# Patient Record
Sex: FEMALE | Race: WHITE | NOT HISPANIC OR LATINO
[De-identification: names, ages, dates, MRNs, and addresses within clinical notes are randomized per-mention and may not be internally consistent; named-entity substitution may affect disease eponyms.]

---

## 2017-10-20 PROBLEM — Z00.00 ENCOUNTER FOR PREVENTIVE HEALTH EXAMINATION: Status: ACTIVE | Noted: 2017-10-20

## 2017-12-14 ENCOUNTER — APPOINTMENT (OUTPATIENT)
Dept: RHEUMATOLOGY | Facility: CLINIC | Age: 57
End: 2017-12-14
Payer: COMMERCIAL

## 2017-12-14 VITALS
WEIGHT: 140 LBS | HEIGHT: 67 IN | DIASTOLIC BLOOD PRESSURE: 72 MMHG | RESPIRATION RATE: 17 BRPM | HEART RATE: 75 BPM | BODY MASS INDEX: 21.97 KG/M2 | SYSTOLIC BLOOD PRESSURE: 132 MMHG | OXYGEN SATURATION: 98 %

## 2017-12-14 DIAGNOSIS — Z82.49 FAMILY HISTORY OF ISCHEMIC HEART DISEASE AND OTHER DISEASES OF THE CIRCULATORY SYSTEM: ICD-10-CM

## 2017-12-14 DIAGNOSIS — Z87.39 PERSONAL HISTORY OF OTHER DISEASES OF THE MUSCULOSKELETAL SYSTEM AND CONNECTIVE TISSUE: ICD-10-CM

## 2017-12-14 DIAGNOSIS — L65.9 NONSCARRING HAIR LOSS, UNSPECIFIED: ICD-10-CM

## 2017-12-14 DIAGNOSIS — I25.2 OLD MYOCARDIAL INFARCTION: ICD-10-CM

## 2017-12-14 DIAGNOSIS — Z86.69 PERSONAL HISTORY OF OTHER DISEASES OF THE NERVOUS SYSTEM AND SENSE ORGANS: ICD-10-CM

## 2017-12-14 DIAGNOSIS — M25.50 PAIN IN UNSPECIFIED JOINT: ICD-10-CM

## 2017-12-14 DIAGNOSIS — Z83.2 FAMILY HISTORY OF DISEASES OF THE BLOOD AND BLOOD-FORMING ORGANS AND CERTAIN DISORDERS INVOLVING THE IMMUNE MECHANISM: ICD-10-CM

## 2017-12-14 DIAGNOSIS — Z87.891 PERSONAL HISTORY OF NICOTINE DEPENDENCE: ICD-10-CM

## 2017-12-14 DIAGNOSIS — Z86.79 PERSONAL HISTORY OF OTHER DISEASES OF THE CIRCULATORY SYSTEM: ICD-10-CM

## 2017-12-14 DIAGNOSIS — Z82.5 FAMILY HISTORY OF ASTHMA AND OTHER CHRONIC LOWER RESPIRATORY DISEASES: ICD-10-CM

## 2017-12-14 DIAGNOSIS — Z82.69 FAMILY HISTORY OF OTHER DISEASES OF THE MUSCULOSKELETAL SYSTEM AND CONNECTIVE TISSUE: ICD-10-CM

## 2017-12-14 DIAGNOSIS — Z78.9 OTHER SPECIFIED HEALTH STATUS: ICD-10-CM

## 2017-12-14 DIAGNOSIS — Z87.19 PERSONAL HISTORY OF OTHER DISEASES OF THE DIGESTIVE SYSTEM: ICD-10-CM

## 2017-12-14 DIAGNOSIS — Z86.39 PERSONAL HISTORY OF OTHER ENDOCRINE, NUTRITIONAL AND METABOLIC DISEASE: ICD-10-CM

## 2017-12-14 DIAGNOSIS — Z83.3 FAMILY HISTORY OF DIABETES MELLITUS: ICD-10-CM

## 2017-12-14 DIAGNOSIS — Z78.0 ASYMPTOMATIC MENOPAUSAL STATE: ICD-10-CM

## 2017-12-14 DIAGNOSIS — Z82.61 FAMILY HISTORY OF ARTHRITIS: ICD-10-CM

## 2017-12-14 DIAGNOSIS — H04.123 DRY EYE SYNDROME OF BILATERAL LACRIMAL GLANDS: ICD-10-CM

## 2017-12-14 DIAGNOSIS — Z87.898 PERSONAL HISTORY OF OTHER SPECIFIED CONDITIONS: ICD-10-CM

## 2017-12-14 DIAGNOSIS — R21 RASH AND OTHER NONSPECIFIC SKIN ERUPTION: ICD-10-CM

## 2017-12-14 DIAGNOSIS — M19.90 UNSPECIFIED OSTEOARTHRITIS, UNSPECIFIED SITE: ICD-10-CM

## 2017-12-14 PROCEDURE — 99205 OFFICE O/P NEW HI 60 MIN: CPT

## 2017-12-15 PROBLEM — Z82.5 FAMILY HISTORY OF ASTHMA: Status: ACTIVE | Noted: 2017-12-14

## 2017-12-15 PROBLEM — H04.123 DRY EYES: Status: RESOLVED | Noted: 2017-12-14 | Resolved: 2017-12-15

## 2017-12-15 PROBLEM — Z82.49 FAMILY HISTORY OF HYPERTENSION: Status: ACTIVE | Noted: 2017-12-14

## 2017-12-15 PROBLEM — Z87.898 HISTORY OF VERTIGO: Status: RESOLVED | Noted: 2017-12-14 | Resolved: 2017-12-15

## 2017-12-15 PROBLEM — Z86.79 HISTORY OF HYPERTENSION: Status: RESOLVED | Noted: 2017-12-14 | Resolved: 2017-12-15

## 2017-12-15 PROBLEM — Z87.19 HISTORY OF DRY MOUTH: Status: RESOLVED | Noted: 2017-12-14 | Resolved: 2017-12-15

## 2017-12-15 PROBLEM — I25.2 HISTORY OF MYOCARDIAL INFARCTION: Status: RESOLVED | Noted: 2017-12-14 | Resolved: 2017-12-15

## 2017-12-15 PROBLEM — Z82.5 FAMILY HISTORY OF EMPHYSEMA: Status: ACTIVE | Noted: 2017-12-14

## 2017-12-15 PROBLEM — Z82.69 FAMILY HISTORY OF OSTEOARTHRITIS: Status: ACTIVE | Noted: 2017-12-14

## 2017-12-15 PROBLEM — Z78.0 HISTORY OF MENOPAUSE: Status: RESOLVED | Noted: 2017-12-14 | Resolved: 2017-12-15

## 2017-12-15 PROBLEM — Z87.39 HISTORY OF BACK PAIN: Status: RESOLVED | Noted: 2017-12-14 | Resolved: 2017-12-15

## 2017-12-15 PROBLEM — L65.9 HAIR LOSS: Status: RESOLVED | Noted: 2017-12-14 | Resolved: 2017-12-15

## 2017-12-15 PROBLEM — Z82.61 FAMILY HISTORY OF RHEUMATOID ARTHRITIS: Status: ACTIVE | Noted: 2017-12-14

## 2017-12-15 PROBLEM — Z86.39 HISTORY OF HYPOTHYROIDISM: Status: RESOLVED | Noted: 2017-12-15 | Resolved: 2017-12-15

## 2017-12-15 PROBLEM — Z83.2 FAMILY HISTORY OF SARCOIDOSIS: Status: ACTIVE | Noted: 2017-12-14

## 2017-12-15 PROBLEM — Z87.891 HISTORY OF TOBACCO USE IN PAST YEAR: Status: ACTIVE | Noted: 2017-12-14

## 2017-12-15 PROBLEM — Z82.49 FAMILY HISTORY OF HEART FAILURE: Status: ACTIVE | Noted: 2017-12-14

## 2017-12-15 PROBLEM — R21 RASH: Status: RESOLVED | Noted: 2017-12-14 | Resolved: 2017-12-15

## 2017-12-15 PROBLEM — Z82.69 FAMILY HISTORY OF GOUT: Status: ACTIVE | Noted: 2017-12-14

## 2017-12-15 PROBLEM — Z83.3 FAMILY HISTORY OF DIABETES MELLITUS: Status: ACTIVE | Noted: 2017-12-14

## 2017-12-15 PROBLEM — Z86.79 HISTORY OF RAYNAUD'S SYNDROME: Status: RESOLVED | Noted: 2017-12-14 | Resolved: 2017-12-15

## 2017-12-15 PROBLEM — Z87.19 HISTORY OF CONSTIPATION: Status: RESOLVED | Noted: 2017-12-14 | Resolved: 2017-12-15

## 2017-12-15 RX ORDER — ALBUTEROL SULFATE 90 UG/1
108 (90 BASE) AEROSOL, METERED RESPIRATORY (INHALATION)
Qty: 8 | Refills: 0 | Status: ACTIVE | COMMUNITY
Start: 2017-08-22

## 2017-12-15 RX ORDER — LEVOTHYROXINE SODIUM 0.15 MG/1
150 TABLET ORAL
Qty: 30 | Refills: 0 | Status: ACTIVE | COMMUNITY
Start: 2017-08-16

## 2017-12-15 RX ORDER — PNV NO.95/FERROUS FUM/FOLIC AC 28MG-0.8MG
TABLET ORAL
Refills: 0 | Status: ACTIVE | COMMUNITY

## 2017-12-15 RX ORDER — FLUTICASONE PROPIONATE AND SALMETEROL 50; 250 UG/1; UG/1
250-50 POWDER RESPIRATORY (INHALATION)
Qty: 180 | Refills: 0 | Status: ACTIVE | COMMUNITY
Start: 2017-09-24

## 2017-12-15 RX ORDER — IBUPROFEN 800 MG/1
800 TABLET, FILM COATED ORAL
Qty: 90 | Refills: 0 | Status: DISCONTINUED | COMMUNITY
End: 2017-12-15

## 2017-12-15 RX ORDER — ALLOPURINOL 100 MG/1
100 TABLET ORAL
Qty: 30 | Refills: 0 | Status: ACTIVE | COMMUNITY
Start: 2017-08-16

## 2017-12-15 RX ORDER — VITAMIN K2 90 MCG
125 MCG CAPSULE ORAL
Refills: 0 | Status: ACTIVE | COMMUNITY

## 2017-12-15 RX ORDER — ALENDRONATE SODIUM 70 MG/1
70 TABLET ORAL
Qty: 4 | Refills: 0 | Status: ACTIVE | COMMUNITY
Start: 2017-12-09

## 2017-12-15 RX ORDER — MECLIZINE HYDROCHLORIDE 25 MG/1
25 TABLET ORAL
Qty: 30 | Refills: 0 | Status: ACTIVE | COMMUNITY
Start: 2017-08-16

## 2017-12-15 RX ORDER — ALBUTEROL 90 MCG
AEROSOL (GRAM) INHALATION
Refills: 0 | Status: ACTIVE | COMMUNITY

## 2017-12-15 RX ORDER — CLOPIDOGREL BISULFATE 75 MG/1
75 TABLET, FILM COATED ORAL
Qty: 30 | Refills: 0 | Status: ACTIVE | COMMUNITY
Start: 2017-08-16

## 2018-01-10 ENCOUNTER — APPOINTMENT (OUTPATIENT)
Dept: RHEUMATOLOGY | Facility: CLINIC | Age: 58
End: 2018-01-10
Payer: COMMERCIAL

## 2018-01-10 VITALS
OXYGEN SATURATION: 96 % | HEART RATE: 73 BPM | RESPIRATION RATE: 16 BRPM | DIASTOLIC BLOOD PRESSURE: 72 MMHG | SYSTOLIC BLOOD PRESSURE: 136 MMHG | BODY MASS INDEX: 21.97 KG/M2 | WEIGHT: 140 LBS | TEMPERATURE: 98.3 F | HEIGHT: 67 IN

## 2018-01-10 DIAGNOSIS — Z87.898 PERSONAL HISTORY OF OTHER SPECIFIED CONDITIONS: ICD-10-CM

## 2018-01-10 PROCEDURE — 99214 OFFICE O/P EST MOD 30 MIN: CPT

## 2018-01-10 RX ORDER — MILNACIPRAN HYDROCHLORIDE 12.5-25-5
12.5 & 25 & 5 KIT ORAL
Qty: 1 | Refills: 0 | Status: DISCONTINUED | COMMUNITY
Start: 2017-12-14 | End: 2018-01-10

## 2018-01-10 RX ORDER — AMOXICILLIN 500 MG/1
500 TABLET, FILM COATED ORAL
Qty: 21 | Refills: 0 | Status: DISCONTINUED | COMMUNITY
Start: 2017-07-10

## 2018-01-10 RX ORDER — BENZONATATE 200 MG/1
200 CAPSULE ORAL
Qty: 20 | Refills: 0 | Status: DISCONTINUED | COMMUNITY
Start: 2017-09-28

## 2018-01-10 RX ORDER — MILNACIPRAN HYDROCHLORIDE 50 MG/1
50 TABLET, FILM COATED ORAL
Qty: 180 | Refills: 0 | Status: DISCONTINUED | COMMUNITY
Start: 2017-12-14 | End: 2018-01-10

## 2018-01-10 RX ORDER — PREDNISONE 20 MG/1
20 TABLET ORAL
Qty: 5 | Refills: 0 | Status: DISCONTINUED | COMMUNITY
Start: 2017-09-28

## 2018-01-10 RX ORDER — CLARITHROMYCIN 500 MG/1
500 TABLET, FILM COATED ORAL
Qty: 20 | Refills: 0 | Status: DISCONTINUED | COMMUNITY
Start: 2017-09-28

## 2018-01-27 ENCOUNTER — RX RENEWAL (OUTPATIENT)
Age: 58
End: 2018-01-27

## 2018-03-14 ENCOUNTER — APPOINTMENT (OUTPATIENT)
Dept: RHEUMATOLOGY | Facility: CLINIC | Age: 58
End: 2018-03-14
Payer: COMMERCIAL

## 2018-03-14 VITALS
OXYGEN SATURATION: 94 % | TEMPERATURE: 98.1 F | RESPIRATION RATE: 17 BRPM | HEIGHT: 67 IN | HEART RATE: 74 BPM | BODY MASS INDEX: 21.97 KG/M2 | DIASTOLIC BLOOD PRESSURE: 69 MMHG | SYSTOLIC BLOOD PRESSURE: 118 MMHG | WEIGHT: 140 LBS

## 2018-03-14 DIAGNOSIS — L65.9 NONSCARRING HAIR LOSS, UNSPECIFIED: ICD-10-CM

## 2018-03-14 PROCEDURE — 99214 OFFICE O/P EST MOD 30 MIN: CPT

## 2018-03-14 RX ORDER — CYCLOBENZAPRINE HYDROCHLORIDE 10 MG/1
10 TABLET, FILM COATED ORAL 3 TIMES DAILY
Qty: 90 | Refills: 3 | Status: DISCONTINUED | COMMUNITY
Start: 2017-08-16 | End: 2018-03-14

## 2018-03-14 RX ORDER — GABAPENTIN 100 MG
100 TABLET ORAL
Refills: 0 | Status: DISCONTINUED | COMMUNITY
End: 2018-03-14

## 2018-03-14 RX ORDER — GABAPENTIN 100 MG/1
100 CAPSULE ORAL
Qty: 30 | Refills: 0 | Status: DISCONTINUED | COMMUNITY
Start: 2017-12-21 | End: 2018-03-14

## 2018-03-14 RX ORDER — BACLOFEN 10 MG/1
10 TABLET ORAL
Qty: 270 | Refills: 0 | Status: DISCONTINUED | COMMUNITY
Start: 2018-01-10 | End: 2018-03-14

## 2018-03-14 RX ORDER — PILOCARPINE HYDROCHLORIDE 7.5 MG/1
7.5 TABLET, FILM COATED ORAL 3 TIMES DAILY
Qty: 270 | Refills: 0 | Status: DISCONTINUED | COMMUNITY
Start: 2017-12-15 | End: 2018-03-14

## 2018-03-14 RX ORDER — OXYCODONE AND ACETAMINOPHEN 5; 325 MG/1; MG/1
5-325 TABLET ORAL
Qty: 30 | Refills: 0 | Status: DISCONTINUED | COMMUNITY
Start: 2017-08-30 | End: 2018-03-14

## 2018-03-14 RX ORDER — CEVIMELINE HYDROCHLORIDE 30 MG/1
30 CAPSULE ORAL TWICE DAILY
Qty: 180 | Refills: 0 | Status: DISCONTINUED | COMMUNITY
Start: 2018-01-10 | End: 2018-03-14

## 2018-03-15 RX ORDER — ETODOLAC 400 MG/1
400 TABLET, FILM COATED ORAL
Qty: 180 | Refills: 0 | Status: DISCONTINUED | COMMUNITY
Start: 2017-12-14 | End: 2018-03-15

## 2018-04-28 ENCOUNTER — RX RENEWAL (OUTPATIENT)
Age: 58
End: 2018-04-28

## 2018-05-07 ENCOUNTER — APPOINTMENT (OUTPATIENT)
Dept: RHEUMATOLOGY | Facility: CLINIC | Age: 58
End: 2018-05-07
Payer: COMMERCIAL

## 2018-05-07 VITALS
HEIGHT: 67 IN | OXYGEN SATURATION: 96 % | WEIGHT: 140 LBS | SYSTOLIC BLOOD PRESSURE: 124 MMHG | BODY MASS INDEX: 21.97 KG/M2 | DIASTOLIC BLOOD PRESSURE: 80 MMHG | RESPIRATION RATE: 16 BRPM | TEMPERATURE: 98.2 F | HEART RATE: 72 BPM

## 2018-05-07 DIAGNOSIS — R68.2 DRY MOUTH, UNSPECIFIED: ICD-10-CM

## 2018-05-07 DIAGNOSIS — R53.83 OTHER FATIGUE: ICD-10-CM

## 2018-05-07 PROCEDURE — 99214 OFFICE O/P EST MOD 30 MIN: CPT

## 2018-05-07 RX ORDER — ROSUVASTATIN CALCIUM 10 MG/1
10 TABLET, FILM COATED ORAL
Qty: 30 | Refills: 0 | Status: DISCONTINUED | COMMUNITY
Start: 2017-08-16 | End: 2018-05-07

## 2018-05-07 RX ORDER — LISINOPRIL 20 MG/1
20 TABLET ORAL
Qty: 30 | Refills: 0 | Status: DISCONTINUED | COMMUNITY
Start: 2017-11-14 | End: 2018-05-07

## 2018-05-07 RX ORDER — ROSUVASTATIN CALCIUM 20 MG/1
20 TABLET, FILM COATED ORAL
Qty: 90 | Refills: 0 | Status: ACTIVE | COMMUNITY
Start: 2018-04-23

## 2018-05-07 RX ORDER — LISINOPRIL 10 MG/1
10 TABLET ORAL
Qty: 30 | Refills: 0 | Status: DISCONTINUED | COMMUNITY
Start: 2017-08-16 | End: 2018-05-07

## 2018-07-23 PROBLEM — Z78.9 ALCOHOL USE: Status: ACTIVE | Noted: 2017-12-15

## 2018-08-08 ENCOUNTER — APPOINTMENT (OUTPATIENT)
Dept: RHEUMATOLOGY | Facility: CLINIC | Age: 58
End: 2018-08-08
Payer: COMMERCIAL

## 2018-08-08 VITALS
OXYGEN SATURATION: 93 % | TEMPERATURE: 98.2 F | RESPIRATION RATE: 16 BRPM | HEART RATE: 86 BPM | HEIGHT: 67 IN | BODY MASS INDEX: 22.76 KG/M2 | DIASTOLIC BLOOD PRESSURE: 74 MMHG | WEIGHT: 145 LBS | SYSTOLIC BLOOD PRESSURE: 122 MMHG

## 2018-08-08 DIAGNOSIS — M25.561 PAIN IN RIGHT KNEE: ICD-10-CM

## 2018-08-08 DIAGNOSIS — M25.562 PAIN IN RIGHT KNEE: ICD-10-CM

## 2018-08-08 DIAGNOSIS — G89.29 PAIN IN RIGHT KNEE: ICD-10-CM

## 2018-08-08 PROCEDURE — 99214 OFFICE O/P EST MOD 30 MIN: CPT

## 2018-10-16 ENCOUNTER — APPOINTMENT (OUTPATIENT)
Dept: RHEUMATOLOGY | Facility: CLINIC | Age: 58
End: 2018-10-16
Payer: COMMERCIAL

## 2018-10-16 VITALS
SYSTOLIC BLOOD PRESSURE: 144 MMHG | BODY MASS INDEX: 21.97 KG/M2 | OXYGEN SATURATION: 97 % | HEIGHT: 67 IN | RESPIRATION RATE: 17 BRPM | DIASTOLIC BLOOD PRESSURE: 76 MMHG | HEART RATE: 84 BPM | TEMPERATURE: 98.7 F | WEIGHT: 140 LBS

## 2018-10-16 DIAGNOSIS — M15.9 POLYOSTEOARTHRITIS, UNSPECIFIED: ICD-10-CM

## 2018-10-16 DIAGNOSIS — M25.551 PAIN IN RIGHT HIP: ICD-10-CM

## 2018-10-16 DIAGNOSIS — M79.7 FIBROMYALGIA: ICD-10-CM

## 2018-10-16 DIAGNOSIS — Z86.39 PERSONAL HISTORY OF OTHER ENDOCRINE, NUTRITIONAL AND METABOLIC DISEASE: ICD-10-CM

## 2018-10-16 DIAGNOSIS — M85.80 OTHER SPECIFIED DISORDERS OF BONE DENSITY AND STRUCTURE, UNSPECIFIED SITE: ICD-10-CM

## 2018-10-16 DIAGNOSIS — M25.552 PAIN IN RIGHT HIP: ICD-10-CM

## 2018-10-16 DIAGNOSIS — H04.123 DRY EYE SYNDROME OF BILATERAL LACRIMAL GLANDS: ICD-10-CM

## 2018-10-16 DIAGNOSIS — Z86.2 PERSONAL HISTORY OF DISEASES OF THE BLOOD AND BLOOD-FORMING ORGANS AND CERTAIN DISORDERS INVOLVING THE IMMUNE MECHANISM: ICD-10-CM

## 2018-10-16 DIAGNOSIS — I73.00 RAYNAUD'S SYNDROME W/OUT GANGRENE: ICD-10-CM

## 2018-10-16 PROCEDURE — 99214 OFFICE O/P EST MOD 30 MIN: CPT

## 2018-10-17 PROBLEM — I73.00 RAYNAUD'S DISEASE WITHOUT GANGRENE: Status: ACTIVE | Noted: 2017-12-15

## 2018-10-17 PROBLEM — M25.551 BILATERAL HIP PAIN: Status: ACTIVE | Noted: 2018-05-07

## 2018-10-17 PROBLEM — Z86.39 HISTORY OF VITAMIN D DEFICIENCY: Status: ACTIVE | Noted: 2017-12-14

## 2018-10-17 PROBLEM — Z86.2 HISTORY OF DISCOID LUPUS ERYTHEMATOSUS: Status: ACTIVE | Noted: 2017-12-14

## 2018-10-17 PROBLEM — M85.80 OSTEOPENIA: Status: ACTIVE | Noted: 2018-01-11

## 2018-10-17 PROBLEM — M15.9 OSTEOARTHRITIS, MULTIPLE SITES: Status: ACTIVE | Noted: 2018-10-17

## 2018-10-17 PROBLEM — H04.123 BILATERAL DRY EYES: Status: ACTIVE | Noted: 2017-12-14

## 2018-10-17 PROBLEM — M79.7 FIBROMYALGIA: Status: ACTIVE | Noted: 2017-12-14

## 2018-10-17 RX ORDER — DICLOFENAC SODIUM 10 MG/G
1 GEL TOPICAL
Qty: 20 | Refills: 0 | Status: ACTIVE | COMMUNITY
Start: 2018-05-07 | End: 1900-01-01

## 2018-10-17 RX ORDER — HYDROXYCHLOROQUINE SULFATE 200 MG/1
200 TABLET, FILM COATED ORAL TWICE DAILY
Qty: 120 | Refills: 0 | Status: ACTIVE | COMMUNITY
Start: 2017-12-14 | End: 1900-01-01

## 2018-10-17 RX ORDER — VALSARTAN 80 MG/1
80 TABLET, COATED ORAL
Qty: 30 | Refills: 0 | Status: DISCONTINUED | COMMUNITY
Start: 2018-04-04 | End: 2018-10-17

## 2018-10-28 ENCOUNTER — RX RENEWAL (OUTPATIENT)
Age: 58
End: 2018-10-28

## 2018-12-10 ENCOUNTER — APPOINTMENT (OUTPATIENT)
Dept: RHEUMATOLOGY | Facility: CLINIC | Age: 58
End: 2018-12-10

## 2018-12-15 ENCOUNTER — RX RENEWAL (OUTPATIENT)
Age: 58
End: 2018-12-15

## 2019-03-07 ENCOUNTER — APPOINTMENT (OUTPATIENT)
Dept: RHEUMATOLOGY | Facility: CLINIC | Age: 59
End: 2019-03-07

## 2022-05-23 ENCOUNTER — RX RENEWAL (OUTPATIENT)
Age: 62
End: 2022-05-23

## 2022-07-22 ENCOUNTER — APPOINTMENT (OUTPATIENT)
Dept: ORTHOPEDIC SURGERY | Facility: CLINIC | Age: 62
End: 2022-07-22

## 2022-08-08 ENCOUNTER — FORM ENCOUNTER (OUTPATIENT)
Age: 62
End: 2022-08-08

## 2022-08-26 ENCOUNTER — APPOINTMENT (OUTPATIENT)
Dept: ORTHOPEDIC SURGERY | Facility: CLINIC | Age: 62
End: 2022-08-26

## 2022-08-26 VITALS — WEIGHT: 140 LBS | BODY MASS INDEX: 21.97 KG/M2 | HEIGHT: 67 IN

## 2022-08-26 PROCEDURE — 99072 ADDL SUPL MATRL&STAF TM PHE: CPT

## 2022-08-26 PROCEDURE — 99215 OFFICE O/P EST HI 40 MIN: CPT | Mod: 25

## 2022-08-26 PROCEDURE — 20610 DRAIN/INJ JOINT/BURSA W/O US: CPT

## 2022-08-26 NOTE — PROCEDURE
[Large Joint Injection] : Large joint injection [Left] : of the left [Shoulder] : shoulder [Pain] : pain [Inflammation] : inflammation [Betadine] : betadine [Ethyl Chloride sprayed topically] : ethyl chloride sprayed topically [Sterile technique used] : sterile technique used [___ cc    1%] : Lidocaine ~Vcc of 1%  [___ cc    40mg] : Triamcinolone (Kenalog) ~Vcc of 40 mg  [] : Patient tolerated procedure well [Call if redness, pain or fever occur] : call if redness, pain or fever occur [Apply ice for 15min out of every hour for the next 12-24 hours as tolerated] : apply ice for 15 minutes out of every hour for the next 12-24 hours as tolerated [Patient was advised to rest the joint(s) for ____ days] : patient was advised to rest the joint(s) for [unfilled] days [Previous OTC use and PT nontherapeutic] : patient has tried OTC's including aspirin, Ibuprofen, Aleve, etc or prescription NSAIDS, and/or exercises at home and/or physical therapy without satisfactory response [Patient had decreased mobility in the joint] : patient had decreased mobility in the joint [Risks, benefits, alternatives discussed / Verbal consent obtained] : the risks benefits, and alternatives have been discussed, and verbal consent was obtained

## 2022-08-26 NOTE — ASSESSMENT
[FreeTextEntry1] : Recommend: - NSAID - Heating pad - Muscle relaxer - Neck stretching exercise - Soft cervical collar - Cervical traction Patient is given neck rehabilitation exercise book. \par \par Recommend: - rest - ice - compression - elevation \par \par Continue HEP \par \par Worker's compensation 100% temporarily disabled \par \par Follow up in 2 months
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
## 2022-08-26 NOTE — HISTORY OF PRESENT ILLNESS
[de-identified] : Follow up cervical spine and left shoulder. Finally got  approval for cervical spine injection, waiting for an appointment. Experiencing constant pain and tension in the left shoulder radiating down to the hand. Has difficulty sleeping and waking up from numbness. Taking Flexeril and Motrin. Using Voltaren.

## 2022-10-06 ENCOUNTER — APPOINTMENT (OUTPATIENT)
Dept: PAIN MANAGEMENT | Facility: CLINIC | Age: 62
End: 2022-10-06

## 2022-10-06 VITALS — HEIGHT: 67 IN | WEIGHT: 140 LBS | BODY MASS INDEX: 21.97 KG/M2

## 2022-10-06 PROCEDURE — 99214 OFFICE O/P EST MOD 30 MIN: CPT

## 2022-10-06 PROCEDURE — 99072 ADDL SUPL MATRL&STAF TM PHE: CPT
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
## 2022-10-07 NOTE — ASSESSMENT
[FreeTextEntry1] : A thorough discussion occurred regarding available pain management treatment options including interventional,\par rehabilitative, pharmacological, and alternative modalities with the patient. We will proceed with the following:\par \par Interventional treatment options:\par - Proceed with left PM C7-T1 MICHAEL with fluoroscopic guidance for suspected cervical radicular component\par - explained diagnostic and therapeutic role for proposed procedure \par - requires cardiology clearance to hold Plavix x 7 days for indicated procedure\par - see additional instructions below\par \par Rehabilitative options:\par - completed prior extensive PT \par - continue HEP as tolerated \par - see additional instructions below\par \par Medication based treatment options:\par - poor candidate for NSAIDs secondary to chronic anti-coagulation \par - continue Flexeril 5 mg up to TID spasm \par - may consider trial of anti- neuropathic medicine with failure of interventional therapy \par - see additional instructions below\par \par Complementary treatment options:\par - lifestyle modifications discussed\par - consider trial of acupuncture \par - See additional instructions below\par \par Additional treatment recommendations as follows:\par - f/u with Dr. Timmons as directed\par - Follow up 1-2 weeks post injection for assessment of efficacy and further recommendations\par \par The risks, benefits and alternatives of the proposed procedure were explained in detail with the patient. The risks outlined include, but are not limited to, infection, bleeding, nerve injury, a temporary increase in pain, failure to resolve symptoms, allergic reaction, and possible elevation of blood sugar in diabetics. All questions were answered to patient's apparent satisfaction and he/she verbalized an understanding.\par \par Daya ANDREWS, acting as scribe, attest that this documentation has been prepared under the direction and in the presence of Provider Ruslan Adame DO\par \par The documentation recorded by the scribe, in my presence, accurately reflects the service I personally performed, and the decisions made by me with my edits as appropriate.

## 2022-10-07 NOTE — WORK
[Total] : total [No Rx restrictions] : No Rx restrictions. [I provided the services listed above] :  I provided the services listed above. [FreeTextEntry1] : poor

## 2022-10-07 NOTE — PHYSICAL EXAM
[4___] : left grasp 4[unfilled]/5 [] : positive Spurling [de-identified] : Constitutional:\par - No acute distress\par - Well developed; well nourished\par \par Neurological:\par - normal mood and affect\par - alert and oriented x 3\par \par Cardiovascular:\par - grossly normal

## 2022-10-14 ENCOUNTER — APPOINTMENT (OUTPATIENT)
Dept: PAIN MANAGEMENT | Facility: CLINIC | Age: 62
End: 2022-10-14

## 2022-10-14 PROCEDURE — 62321 NJX INTERLAMINAR CRV/THRC: CPT

## 2022-10-14 PROCEDURE — 99072 ADDL SUPL MATRL&STAF TM PHE: CPT

## 2022-10-14 NOTE — PROCEDURE
[FreeTextEntry3] : Date of Service: 10/14/2022 \par \par Account: [id number]\par \par Patient: WINDY MELTON \par \par YOB: 1960\par \par Age: 62 year\par \par Surgeon:      Ruslan Adame DO\par \par Assistant:    None\par \par Pre-Operative Diagnosis:         Cervical Radiculopathy (M54.12)\par \par Post Operative Diagnosis:       Cervical Radiculopathy (M54.12)\par \par Procedure:             Left paramedian (C7-T1) interlaminar epidural steroid injection under fluoroscopic guidance\par \par Anesthesia:  MAC\par \par This procedure was carried out using fluoroscopic guidance.  The risks and benefits of the procedure were discussed extensively with the patient.  The consent of the patient was obtained and the following procedure was performed. The patient was placed in the prone position on the fluoroscopy table and the area was prepped and draped in a sterile fashion.  A timeout was performed with all essential staff present and the site and side were verified.\par \par The patient was placed in the prone position and optimized to patient comfort.  The cervical area was prepped and draped in a sterile fashion.  The fluoroscope visualized the C7-T1 interspace using slight cephalad-caudad angulation and this area was marked.  Using sterile technique the superficial skin was anesthetized with 1% Lidocaine.  A 20 gauge 3.5 inch Tuohy needle was advanced under fluoroscopy until ligament was engaged.  Using a contralateral oblique view, a "loss of resistance" to air technique was utilized in order to gain access to the epidural space.  After negative aspiration for heme and CSF, 1 cc of Omnipaque contrast was administered and the appropriate cervical epidurogram was obtained in the THOMAS and A/P view as well as digital subtraction angiography.\par \par A total injectate of 3 cc of preservative free normal saline and 40 mg of Kenalog was then injected into the epidural space while maintaining meaningful verbal contact with the patient.  \par \par The needle was subsequently removed.  Vital signs remained normal.  Pulse oximeter was used throughout the procedure and the patient's pulse and oxygen saturation remained within normal limits.  The patient tolerated the procedure well.  There were no complications.  The patient was instructed to apply ice over the injection sites for twenty minutes every two hours for the next 24 to 48 hours.\par \par Disposition:\par      1. The patient was advised to F/U in 1-2 weeks to assess the response to the injection.\par      2. The patient was also instructed to contact me immediately if there were any concerns related to the procedure performed.

## 2022-11-10 ENCOUNTER — APPOINTMENT (OUTPATIENT)
Dept: ORTHOPEDIC SURGERY | Facility: CLINIC | Age: 62
End: 2022-11-10

## 2022-11-10 DIAGNOSIS — E05.80 OTHER THYROTOXICOSIS W/OUT THYROTOXIC CRISIS OR STORM: ICD-10-CM

## 2022-11-10 DIAGNOSIS — M85.851 OTHER SPECIFIED DISORDERS OF BONE DENSITY AND STRUCTURE, RIGHT THIGH: ICD-10-CM

## 2022-11-10 DIAGNOSIS — M85.88 OTHER SPECIFIED DISORDERS OF BONE DENSITY AND STRUCTURE, OTHER SITE: ICD-10-CM

## 2022-11-10 DIAGNOSIS — M85.852 OTHER SPECIFIED DISORDERS OF BONE DENSITY AND STRUCTURE, RIGHT THIGH: ICD-10-CM

## 2022-11-10 PROCEDURE — 99215 OFFICE O/P EST HI 40 MIN: CPT

## 2022-11-10 PROCEDURE — 99072 ADDL SUPL MATRL&STAF TM PHE: CPT

## 2022-11-10 NOTE — HISTORY OF PRESENT ILLNESS
[de-identified] : Follow up cervical spine and left shoulder. Finally got  approval for cervical spine injection and got CARLOS one month ago with no relief. Experiencing worse pain than last time. Pt fell twice since last visit. Experiencing constant pain and tension in the left shoulder radiating down to the hand. Has difficulty sleeping and waking up from numbness. Taking Flexeril with relief and Motrin with minimal relief. Using Voltaren with some relief. Did some home exercises with little relief.

## 2022-11-22 ENCOUNTER — FORM ENCOUNTER (OUTPATIENT)
Age: 62
End: 2022-11-22

## 2022-11-30 ENCOUNTER — RESULT REVIEW (OUTPATIENT)
Age: 62
End: 2022-11-30

## 2022-12-01 ENCOUNTER — APPOINTMENT (OUTPATIENT)
Dept: PAIN MANAGEMENT | Facility: CLINIC | Age: 62
End: 2022-12-01

## 2022-12-01 ENCOUNTER — FORM ENCOUNTER (OUTPATIENT)
Age: 62
End: 2022-12-01

## 2022-12-01 NOTE — PHYSICAL EXAM
[de-identified] : Constitutional:\par - No acute distress\par - Well developed; well nourished\par \par Neurological:\par - normal mood and affect\par - alert and oriented x 3\par \par Cardiovascular:\par - grossly normal [4___] : left grasp 4[unfilled]/5 [] : positive Spurling
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
## 2022-12-07 ENCOUNTER — APPOINTMENT (OUTPATIENT)
Dept: ORTHOPEDIC SURGERY | Facility: CLINIC | Age: 62
End: 2022-12-07

## 2023-01-27 ENCOUNTER — APPOINTMENT (OUTPATIENT)
Dept: ORTHOPEDIC SURGERY | Facility: CLINIC | Age: 63
End: 2023-01-27
Payer: OTHER MISCELLANEOUS

## 2023-01-27 VITALS — HEIGHT: 67 IN | WEIGHT: 140 LBS | BODY MASS INDEX: 21.97 KG/M2

## 2023-01-27 DIAGNOSIS — M75.102 UNSPECIFIED ROTATOR CUFF TEAR OR RUPTURE OF LEFT SHOULDER, NOT SPECIFIED AS TRAUMATIC: ICD-10-CM

## 2023-01-27 PROCEDURE — 20610 DRAIN/INJ JOINT/BURSA W/O US: CPT

## 2023-01-27 PROCEDURE — 99215 OFFICE O/P EST HI 40 MIN: CPT | Mod: 25

## 2023-01-27 PROCEDURE — 99072 ADDL SUPL MATRL&STAF TM PHE: CPT

## 2023-01-27 NOTE — HISTORY OF PRESENT ILLNESS
[de-identified] : Follow up Left Shoulder MRI Results at P. Experiencing pain radiating down the arm. Taking Flexeril and Motrin, needs new rx.

## 2023-01-27 NOTE — PROCEDURE
[Large Joint Injection] : Large joint injection [Left] : of the left [Shoulder] : shoulder [Pain] : pain [Inflammation] : inflammation [X-ray evidence of Osteoarthritis on this or prior visit] : x-ray evidence of Osteoarthritis on this or prior visit [Betadine] : betadine [Ethyl Chloride sprayed topically] : ethyl chloride sprayed topically [Sterile technique used] : sterile technique used [___ cc    1%] : Lidocaine ~Vcc of 1%  [___ cc    40mg] : Triamcinolone (Kenalog) ~Vcc of 40 mg  [] : Patient tolerated procedure well [Call if redness, pain or fever occur] : call if redness, pain or fever occur [Apply ice for 15min out of every hour for the next 12-24 hours as tolerated] : apply ice for 15 minutes out of every hour for the next 12-24 hours as tolerated [Patient was advised to rest the joint(s) for ____ days] : patient was advised to rest the joint(s) for [unfilled] days [Previous OTC use and PT nontherapeutic] : patient has tried OTC's including aspirin, Ibuprofen, Aleve, etc or prescription NSAIDS, and/or exercises at home and/or physical therapy without satisfactory response [Patient had decreased mobility in the joint] : patient had decreased mobility in the joint [Risks, benefits, alternatives discussed / Verbal consent obtained] : the risks benefits, and alternatives have been discussed, and verbal consent was obtained

## 2023-01-27 NOTE — ASSESSMENT
[FreeTextEntry1] : Left shoulder:\par Status post rotator cuff repair with full-thickness re-tear of the rotator cuff construct of the supraspinatus tendon with tendon retraction 1.5 cm\par \par Patient given prescription for MRI, follow up after study is completed to discuss results. \par \par Recommend: - NSAID - Heating pad - Muscle relaxer - Neck stretching exercise - Soft cervical collar - Cervical traction Patient is given neck rehabilitation exercise book. \par \par Recommend: - rest - ice - compression - elevation \par \par Continue HEP \par \par Worker's compensation 100% temporarily disabled

## 2023-01-27 NOTE — DATA REVIEWED
18-Aug-2021 19-Aug-2021 09:38 20-Aug-2021 08:21 [MRI] : MRI [Left] : left 18-Aug-2021 10:11 [Shoulder] : shoulder [Report was reviewed and noted in the chart] : The report was reviewed and noted in the chart [I independently reviewed and interpreted images and report] : I independently reviewed and interpreted images and report [FreeTextEntry1] : Status post rotator cuff repair with full-thickness re-tear of the rotator cuff construct of the supraspinatus tendon with tendon retraction 1.5 cm 19-Aug-2021 14:32

## 2023-02-13 ENCOUNTER — FORM ENCOUNTER (OUTPATIENT)
Age: 63
End: 2023-02-13

## 2023-02-18 ENCOUNTER — RESULT REVIEW (OUTPATIENT)
Age: 63
End: 2023-02-18

## 2023-02-19 ENCOUNTER — FORM ENCOUNTER (OUTPATIENT)
Age: 63
End: 2023-02-19

## 2023-03-15 ENCOUNTER — APPOINTMENT (OUTPATIENT)
Dept: ORTHOPEDIC SURGERY | Facility: CLINIC | Age: 63
End: 2023-03-15

## 2023-03-20 ENCOUNTER — APPOINTMENT (OUTPATIENT)
Dept: PAIN MANAGEMENT | Facility: CLINIC | Age: 63
End: 2023-03-20
Payer: OTHER MISCELLANEOUS

## 2023-03-20 VITALS — BODY MASS INDEX: 21.97 KG/M2 | WEIGHT: 140 LBS | HEIGHT: 67 IN

## 2023-03-20 DIAGNOSIS — G89.29 PAIN IN LEFT SHOULDER: ICD-10-CM

## 2023-03-20 DIAGNOSIS — M25.512 PAIN IN LEFT SHOULDER: ICD-10-CM

## 2023-03-20 PROCEDURE — 99072 ADDL SUPL MATRL&STAF TM PHE: CPT

## 2023-03-20 PROCEDURE — 99214 OFFICE O/P EST MOD 30 MIN: CPT

## 2023-03-20 RX ORDER — CYCLOBENZAPRINE HYDROCHLORIDE 10 MG/1
10 TABLET, FILM COATED ORAL 3 TIMES DAILY
Qty: 270 | Refills: 0 | Status: DISCONTINUED | COMMUNITY
Start: 2018-03-15 | End: 2023-03-20

## 2023-03-20 RX ORDER — IBUPROFEN 800 MG
800 TABLET ORAL
Refills: 0 | Status: DISCONTINUED | COMMUNITY
End: 2023-03-20

## 2023-03-20 RX ORDER — IBUPROFEN 800 MG/1
800 TABLET, FILM COATED ORAL 3 TIMES DAILY
Qty: 90 | Refills: 2 | Status: DISCONTINUED | COMMUNITY
Start: 2022-04-22 | End: 2023-03-20
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
## 2023-03-20 NOTE — DATA REVIEWED
[Cervical Spine] : cervical spine [I reviewed the films/CD] : I reviewed the films/CD [MRI] : MRI [Left] : left [Shoulder] : shoulder [Report was reviewed and noted in the chart] : The report was reviewed and noted in the chart

## 2023-03-20 NOTE — PHYSICAL EXAM
[Left] : left hand [de-identified] : Constitutional:\par - No acute distress\par - Well developed; well nourished\par \par Neurological:\par - normal mood and affect\par - alert and oriented x 3\par \par Cardiovascular:\par - grossly normal\par \par Cervical Spine Exam: \par \par Inspection:  \par erythema (-)  \par ecchymosis (-)  \par rashes (-)  \par \par Palpation:                                                   \par Cervical paraspinal tenderness:         R (-); L(-) \par Upper trapezius tenderness:              R (-); L (+) \par Rhomboids tenderness:                      R (-); L (+) \par Occipital Ridge:                                    R (-); L (-) \par Supraspinatus tenderness:                 R (-); L (-) \par \par ROM: \par Reduced ROM all planes\par pain with minimal extension and flexion\par \par Strength Testing:             \par Deltoid                           R (5/5); L (4+/5) \par Biceps:                          R (5/5); L (4/5) \par Triceps:                         R (5/5); L (5/5) \par Finger Abductors:         R (5/5); L (5/5) \par Grasp:                           R (5/5); L (4/5) \par \par Special Testing: \par Spurling Test:                  R (-); L (-) \par Facet load test:               R (-); L (+) \par \par Neuro: \par SILT throughout right upper extremity \par SILT throughout left upper extremity \par \par Reflexes: \par Biceps   -           R (2+); L (2+) \par Triceps  -           R (2+); L (2+) \par Brachioradialis- R (2+); L (2+)   \par \par No ankle clonus  [] : positive Phalen's

## 2023-03-20 NOTE — WORK
[No Rx restrictions] : No Rx restrictions. [I provided the services listed above] :  I provided the services listed above. [Total (100%)] : total (100%) [Patient] : patient [FreeTextEntry1] : poor [FreeTextEntry3] : Degree of impairment with regard to cervical spine only

## 2023-03-20 NOTE — ASSESSMENT
[FreeTextEntry1] : A thorough discussion occurred regarding available pain management treatment options including interventional,\par rehabilitative, pharmacological, and alternative modalities with the patient. We will proceed with the following:\par \par Interventional treatment options:\par - We will proceed with TPI for cervical myofascial pain component\par - Patient without diagnostic or therapeutic benefit following MICHAEL; would not recommend further at this point\par - see additional instructions below\par \par Rehabilitative options:\par - Continue physical therapy as per orthopedic\par - continue HEP as tolerated\par \par Medication based treatment options:\par - Advised against use of NSAIDs secondary to chronic anti-coagulation \par - D/C Flexeril; start methocarbamol 750 up to TID as needed for spasm\par - may consider trial of anti- neuropathic medicine with failure of interventional therapy\par - May consider addition of topical OTC analgesic\par - see additional instructions below\par \par Complementary treatment options:\par - lifestyle modifications discussed\par - consider trial of acupuncture\par \par Additional treatment recommendations as follows:\par - f/u with Dr. Timmons as directed\par - Follow-up for TPI as directed\par - Possible EMG/NCV UE for ongoing upper extremity paresthesias; rule out peripheral nerve entrapment\par \par The risks, benefits and alternatives of the proposed procedure were explained in detail with the patient. The risks outlined include, but are not limited to, infection, bleeding, nerve injury, a temporary increase in pain, failure to resolve symptoms, allergic reaction, and possible elevation of blood sugar in diabetics. All questions were answered to patient's apparent satisfaction and he/she verbalized an understanding.\par \par JULIANA, Remi Jacobs acting as scribe, attest that this documentation has been prepared under the direction and in the presence of Provider Ruslan Adame DO. \par \par The documentation recorded by the scribe, in my presence, accurately reflects the service I personally performed, and the decisions made by me with my edits as appropriate.

## 2023-03-22 ENCOUNTER — FORM ENCOUNTER (OUTPATIENT)
Age: 63
End: 2023-03-22

## 2023-03-23 ENCOUNTER — APPOINTMENT (OUTPATIENT)
Dept: ORTHOPEDIC SURGERY | Facility: CLINIC | Age: 63
End: 2023-03-23
Payer: OTHER MISCELLANEOUS

## 2023-03-23 VITALS — WEIGHT: 140 LBS | BODY MASS INDEX: 21.97 KG/M2 | HEIGHT: 67 IN

## 2023-03-23 DIAGNOSIS — M75.42 IMPINGEMENT SYNDROME OF LEFT SHOULDER: ICD-10-CM

## 2023-03-23 DIAGNOSIS — M50.220 OTHER CERVICAL DISC DISPLACEMENT, MID-CERVICAL REGION, UNSPECIFIED LEVEL: ICD-10-CM

## 2023-03-23 DIAGNOSIS — M75.02 ADHESIVE CAPSULITIS OF LEFT SHOULDER: ICD-10-CM

## 2023-03-23 PROCEDURE — 99215 OFFICE O/P EST HI 40 MIN: CPT | Mod: ACP

## 2023-03-23 RX ORDER — TIZANIDINE 4 MG/1
4 TABLET ORAL EVERY 8 HOURS
Qty: 90 | Refills: 0 | Status: ACTIVE | COMMUNITY
Start: 2023-03-23 | End: 1900-01-01

## 2023-03-23 NOTE — ASSESSMENT
[FreeTextEntry1] : MRI Cervical\par ACDF C4-6 instrumentation stable, no signs of loosening\par No residual stenosis C4-6\par Small central HNP C6-7\par \par Left shoulder:\par Status post rotator cuff repair with full-thickness re-tear of the rotator cuff construct of the supraspinatus tendon with tendon retraction 1.5 cm\par \par Recommend: - NSAID - Heating pad - Muscle relaxer - Neck stretching exercise - Soft cervical collar - Cervical traction Patient is given neck rehabilitation exercise book. \par \par Recommend: - rest - ice - compression - elevation \par \par Patient responded well to physical therapy with symptomatic improvement. Recommend continuing physical therapy to regain range of motion and strength.\par \par Worker's compensation 100% temporarily disabled \par \par Follow up in 2 months

## 2023-03-23 NOTE — HISTORY OF PRESENT ILLNESS
[de-identified] : Follow up cervical spine MRI Results at . Admits to feeling minimal relief from CSI. Saw Dr. Adame and is scheduled for trigger point injections next month. Admits to having frequent pain when moving the left arm. Admits to taking Flexeril and Motrin. Dr. Adame prescribed her Methocarbamol, which she has not started yet. Patient is scheduled to have trigger point injections with Dr. Adame next month.

## 2023-03-23 NOTE — DATA REVIEWED
[MRI] : MRI [Cervical Spine] : cervical spine [Report was reviewed and noted in the chart] : The report was reviewed and noted in the chart [I independently reviewed and interpreted images and report] : I independently reviewed and interpreted images and report [FreeTextEntry1] : ACDF C4-6 instrumentation stable, no signs of loosening\par No residual stenosis C4-6\par Small central HNP C6-7

## 2023-03-26 ENCOUNTER — FORM ENCOUNTER (OUTPATIENT)
Age: 63
End: 2023-03-26

## 2023-03-28 ENCOUNTER — FORM ENCOUNTER (OUTPATIENT)
Age: 63
End: 2023-03-28

## 2023-04-17 ENCOUNTER — APPOINTMENT (OUTPATIENT)
Dept: PAIN MANAGEMENT | Facility: CLINIC | Age: 63
End: 2023-04-17
Payer: OTHER MISCELLANEOUS

## 2023-04-17 VITALS — HEIGHT: 67 IN | WEIGHT: 142 LBS | BODY MASS INDEX: 22.29 KG/M2

## 2023-04-17 PROCEDURE — J3490M: CUSTOM

## 2023-04-17 PROCEDURE — 20553 NJX 1/MLT TRIGGER POINTS 3/>: CPT

## 2023-04-17 PROCEDURE — 99213 OFFICE O/P EST LOW 20 MIN: CPT | Mod: 25

## 2023-04-17 RX ORDER — METHOCARBAMOL 750 MG/1
750 TABLET, FILM COATED ORAL 3 TIMES DAILY
Qty: 90 | Refills: 2 | Status: DISCONTINUED | COMMUNITY
Start: 2023-03-22 | End: 2023-04-17

## 2023-04-17 NOTE — PROCEDURE
[Left] : of the left [Cervical paraspinal muscle] : cervical paraspinal muscle [Pain] : pain [Ethyl Chloride sprayed topically] : ethyl chloride sprayed topically [___ cc    0.25%] : Bupivacaine (Marcaine) ~Vcc of 0.25%  [Call if redness, pain or fever occur] : call if redness, pain or fever occur [Apply ice for 15min out of every hour for the next 12-24 hours as tolerated] : apply ice for 15 minutes out of every hour for the next 12-24 hours as tolerated [Risks, benefits, alternatives discussed / Verbal consent obtained] : the risks benefits, and alternatives have been discussed, and verbal consent was obtained [Trigger point 3 or more muscle groups] : Trigger point 3 or more muscle groups [Trapezius muscle] : trapezius muscle [Rhomboid muscle] : rhomboid muscle [Sterile technique used] : sterile technique used [] : Patient tolerated procedure well

## 2023-04-17 NOTE — PHYSICAL EXAM
[Left] : left hand [] : positive Phalen's [de-identified] : Constitutional:\par - No acute distress\par - Well developed; well nourished\par \par Neurological:\par - normal mood and affect\par - alert and oriented x 3\par \par Cardiovascular:\par - grossly normal\par \par Cervical Spine Exam: \par \par Inspection:  \par erythema (-)  \par ecchymosis (-)  \par rashes (-)  \par \par Palpation:                                                   \par Cervical paraspinal tenderness:         R (-); L(+) \par Upper trapezius tenderness:              R (-); L (+) \par Rhomboids tenderness:                      R (-); L (+) \par Occipital Ridge:                                    R (-); L (-) \par Supraspinatus tenderness:                 R (-); L (-) \par \par ROM: \par Reduced all planes\par pain with minimal extension and flexion\par \par Strength Testing:             \par Deltoid                           R (5/5); L (4+/5) \par Biceps:                          R (5/5); L (4/5) \par Triceps:                         R (5/5); L (5/5) \par Finger Abductors:         R (5/5); L (5/5) \par Grasp:                           R (5/5); L (4/5) \par \par Special Testing: \par Spurling Test:                  R (-); L (-) \par Facet load test:               R (-); L (+) \par \par Neuro: \par SILT throughout right upper extremity \par SILT throughout left upper extremity \par \par Reflexes: \par Biceps   -           R (2+); L (2+) \par Triceps  -           R (2+); L (2+) \par Brachioradialis- R (2+); L (2+)   \par \par No ankle clonus

## 2023-04-17 NOTE — ASSESSMENT
[FreeTextEntry1] : A thorough discussion occurred regarding available pain management treatment options including interventional,\par rehabilitative, pharmacological, and alternative modalities with the patient. We will proceed with the following:\par \par Interventional treatment options:\par - Proceed with TPI today for cervical myofascial pain component; can repeat Q6 weeks or on PRN basis\par - Patient without diagnostic or therapeutic benefit following MICHAEL; would not recommend further at this point\par - Significant relief with left shoulder CSI\par - see additional instructions below\par \par Rehabilitative options:\par - Continue physical therapy as per orthopedics\par - Encouraged active participation in HEP as tolerated\par - Cervical spine home exercise sheet provided\par \par Medication based treatment options:\par - Advised against use of oral NSAIDs secondary to chronic anti-coagulation \par - Continue Flexeril 5-10 mg up to TID as needed for spasm\par - may consider trial of anti- neuropathic medicine with failure of interventional therapy\par - May consider addition of topical OTC analgesic\par - see additional instructions below\par \par Complementary treatment options:\par - lifestyle modifications discussed\par - consider trial of acupuncture\par \par Additional treatment recommendations as follows:\par - f/u with Dr. Timmons as directed\par - Follow-up for TPI as directed or 3 months\par - Possible EMG/NCV UE for ongoing upper extremity paresthesias; rule out peripheral nerve entrapment\par \par The risks, benefits and alternatives of the proposed procedure were explained in detail with the patient. The risks outlined include, but are not limited to, infection, bleeding, nerve injury, a temporary increase in pain, failure to resolve symptoms, allergic reaction, and possible elevation of blood sugar in diabetics. All questions were answered to patient's apparent satisfaction and he/she verbalized an understanding.\par \par I, Adrian YI, personally performed the services described in this documentation incident to Ruslan Adame DO.\par \par The documentation recorded by the scribe, in my presence, accurately reflects the service I personally performed, and the decisions made by me with my edits as appropriate.
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
## 2023-05-01 ENCOUNTER — FORM ENCOUNTER (OUTPATIENT)
Age: 63
End: 2023-05-01

## 2023-05-18 ENCOUNTER — APPOINTMENT (OUTPATIENT)
Dept: ORTHOPEDIC SURGERY | Facility: CLINIC | Age: 63
End: 2023-05-18
Payer: MEDICARE

## 2023-05-18 VITALS — BODY MASS INDEX: 22.29 KG/M2 | WEIGHT: 142 LBS | HEIGHT: 67 IN

## 2023-05-18 PROCEDURE — 99214 OFFICE O/P EST MOD 30 MIN: CPT

## 2023-05-18 NOTE — IMAGING
[Outside films reviewed] : Outside films reviewed [Facet arthropathy] : Facet arthropathy [Disc space narrowing] : Disc space narrowing [Scoliosis] : Scoliosis [Spondylolithesis] : Spondylolithesis

## 2023-05-18 NOTE — HISTORY OF PRESENT ILLNESS
[Lower back] : lower back [Sudden] : sudden [10] : 10 [6] : 6 [Dull/Aching] : dull/aching [Sharp] : sharp [Constant] : constant [Walking/activity] : walking/activity [Sitting] : sitting [Lying in bed] : lying in bed [de-identified] : Patient presents today with lower back pain for years, worse since falling off a tube in [place] in November. Denies previous treatments. States her pain is localized. States she has numbness in bilateral feet. States she has difficulty getting in and out of bed. Admits to taking Flexeril and Motrin. Had lumbar spine xrays at .  [] : no [de-identified] : lumbar spine xray at [facility]

## 2023-05-18 NOTE — ASSESSMENT
[FreeTextEntry1] : Patient first saw PCP for lumbar spine on 4/27/23.  Patient was advised to take OTC NSAIDs.\par \par Patient given prescription for EMG/NCS, follow up after study is completed to discuss results. \par \par Patient given back HEP booklet.  \par \par Recommend: - NSAID - Heating pad - Muscle relaxer - Core strengthening exercise - Hamstring stretching exercise Patient is given back rehabilitation exercise book. \par

## 2023-05-23 ENCOUNTER — APPOINTMENT (OUTPATIENT)
Dept: NEUROLOGY | Facility: CLINIC | Age: 63
End: 2023-05-23

## 2023-05-25 ENCOUNTER — APPOINTMENT (OUTPATIENT)
Dept: ORTHOPEDIC SURGERY | Facility: CLINIC | Age: 63
End: 2023-05-25

## 2023-05-30 ENCOUNTER — APPOINTMENT (OUTPATIENT)
Dept: NEUROLOGY | Facility: CLINIC | Age: 63
End: 2023-05-30
Payer: MEDICARE

## 2023-05-30 PROCEDURE — 95912 NRV CNDJ TEST 11-12 STUDIES: CPT

## 2023-05-30 PROCEDURE — 95886 MUSC TEST DONE W/N TEST COMP: CPT

## 2023-06-05 RX ORDER — CYCLOBENZAPRINE HYDROCHLORIDE 10 MG/1
10 TABLET, FILM COATED ORAL 3 TIMES DAILY
Qty: 90 | Refills: 2 | Status: ACTIVE | COMMUNITY
Start: 2022-04-22 | End: 1900-01-01

## 2023-06-08 ENCOUNTER — FORM ENCOUNTER (OUTPATIENT)
Age: 63
End: 2023-06-08

## 2023-06-08 ENCOUNTER — APPOINTMENT (OUTPATIENT)
Dept: ORTHOPEDIC SURGERY | Facility: CLINIC | Age: 63
End: 2023-06-08
Payer: MEDICARE

## 2023-06-08 VITALS — HEIGHT: 67 IN | WEIGHT: 142 LBS | BODY MASS INDEX: 22.29 KG/M2

## 2023-06-08 PROCEDURE — 99214 OFFICE O/P EST MOD 30 MIN: CPT

## 2023-06-08 NOTE — ASSESSMENT
[FreeTextEntry1] : Patient first saw PCP for lumbar spine on 4/27/23.  Patient was advised to take OTC NSAIDs.\par \par Bilateral chronic L4, L5, S1 radiculopathy \par \par Patient has been doing a home exercise plan based on exercises given on their last office visit.  These exercises include calf stretching, hamstring stretching, hip flexor stretching, hip rotator stretch, quad stretching, curl ups, bridges, prone press up, knee lift leg reach and wall slide.  Patient has been doing these exercises for over 6 weeks, roughly 4 times a week for 30 minutes daily.  Patient is still experiencing low back pain with radiculopathy. \par \par Patient given prescription for MRI, follow up after study is completed to discuss results.  \par \par Continue HEP\par \par Recommend: - NSAID - Heating pad - Muscle relaxer - Core strengthening exercise - Hamstring stretching exercise Patient is given back rehabilitation exercise book. \par

## 2023-06-08 NOTE — DATA REVIEWED
[EMG Nerve Conduction] : A EMG Nerve Conduction test was completed of the [Bilateral] : bilateral [Lower extremity] : lower extremity [Positive] : positive [Consistent with radiculopathy] : consistent with radiculopathy [FreeTextEntry1] : Bilateral chronic L4, L5, S1 radiculopathy

## 2023-06-08 NOTE — HISTORY OF PRESENT ILLNESS
[Lower back] : lower back [Sudden] : sudden [10] : 10 [6] : 6 [Dull/Aching] : dull/aching [Sharp] : sharp [Constant] : constant [Walking/activity] : walking/activity [Sitting] : sitting [Lying in bed] : lying in bed [de-identified] : Follow up lumbar spine and EMG Results. Denies starting PT. States she has constant pain while lying down. Admits to taking Flexeril and Motrin.  [] : no [de-identified] : lumbar spine xray at [facility]

## 2023-06-09 ENCOUNTER — RESULT REVIEW (OUTPATIENT)
Age: 63
End: 2023-06-09

## 2023-06-15 ENCOUNTER — APPOINTMENT (OUTPATIENT)
Dept: ORTHOPEDIC SURGERY | Facility: CLINIC | Age: 63
End: 2023-06-15

## 2023-06-21 ENCOUNTER — APPOINTMENT (OUTPATIENT)
Dept: ORTHOPEDIC SURGERY | Facility: CLINIC | Age: 63
End: 2023-06-21
Payer: MEDICARE

## 2023-06-21 VITALS — HEIGHT: 67 IN | WEIGHT: 142 LBS | BODY MASS INDEX: 22.29 KG/M2

## 2023-06-21 PROCEDURE — 99214 OFFICE O/P EST MOD 30 MIN: CPT

## 2023-06-21 NOTE — DATA REVIEWED
[MRI] : MRI [Lumbar Spine] : lumbar spine [Report was reviewed and noted in the chart] : The report was reviewed and noted in the chart [I independently reviewed and interpreted images and report] : I independently reviewed and interpreted images and report [FreeTextEntry1] : Spondylolisthesis L5-S1\par Severe stenosis L4-S1\par Moderate to severe stenosis L3-5\par DIffuse DDD

## 2023-06-21 NOTE — HISTORY OF PRESENT ILLNESS
[Lower back] : lower back [Sudden] : sudden [10] : 10 [6] : 6 [Dull/Aching] : dull/aching [Sharp] : sharp [Constant] : constant [Walking/activity] : walking/activity [Sitting] : sitting [Lying in bed] : lying in bed [de-identified] : Follow up lumbar spine MRI Results at [facility]. States she has constant pain while lying down. Admits to taking Flexeril and Motrin.  [] : no [de-identified] : lumbar spine xray at [facility]

## 2023-06-21 NOTE — ASSESSMENT
[FreeTextEntry1] : Spondylolisthesis L5-S1\par Severe stenosis L4-S1\par Moderate to severe stenosis L3-5\par DIffuse DDD\par \par Bilateral chronic L4, L5, S1 radiculopathy \par \par Referral to pain management for injections, follow up 2 weeks after injection. \par \par Continue HEP\par \par Recommend: - NSAID - Heating pad - Muscle relaxer - Core strengthening exercise - Hamstring stretching exercise Patient is given back rehabilitation exercise book. \par \par Follow up in 2 months\par

## 2023-07-17 ENCOUNTER — APPOINTMENT (OUTPATIENT)
Dept: PAIN MANAGEMENT | Facility: CLINIC | Age: 63
End: 2023-07-17
Payer: OTHER MISCELLANEOUS

## 2023-07-17 VITALS — WEIGHT: 142 LBS | BODY MASS INDEX: 22.29 KG/M2 | HEIGHT: 67 IN

## 2023-07-17 DIAGNOSIS — M43.12 SPONDYLOLISTHESIS, CERVICAL REGION: ICD-10-CM

## 2023-07-17 PROCEDURE — J3490M: CUSTOM

## 2023-07-17 PROCEDURE — 99213 OFFICE O/P EST LOW 20 MIN: CPT | Mod: 25

## 2023-07-17 PROCEDURE — 20553 NJX 1/MLT TRIGGER POINTS 3/>: CPT

## 2023-07-17 NOTE — PHYSICAL EXAM
[Left] : left hand [] : positive Phalen's [de-identified] : Constitutional:\par - No acute distress\par - Well developed; well nourished\par \par Neurological:\par - normal mood and affect\par - alert and oriented x 3\par \par Cardiovascular:\par - grossly normal\par \par Cervical Spine Exam: \par \par Inspection:  \par erythema (-)  \par ecchymosis (-)  \par rashes (-)  \par \par Palpation:                                                   \par Cervical paraspinal tenderness:         R (-); L(+) \par Upper trapezius tenderness:              R (-); L (+) \par Rhomboids tenderness:                      R (-); L (+) \par Occipital Ridge:                                    R (-); L (-) \par Supraspinatus tenderness:                 R (-); L (-) \par \par ROM: \par Reduced all planes\par pain with minimal extension and flexion\par \par Strength Testing:             \par Deltoid                           R (5/5); L (4+/5) \par Biceps:                          R (5/5); L (4/5) \par Triceps:                         R (5/5); L (5/5) \par Finger Abductors:         R (5/5); L (5/5) \par Grasp:                           R (5/5); L (4/5) \par \par Special Testing: \par Spurling Test:                  R (-); L (-) \par Facet load test:               R (-); L (+) \par \par Neuro: \par SILT throughout right upper extremity \par SILT throughout left upper extremity \par \par Reflexes: \par Biceps   -           R (2+); L (2+) \par Triceps  -           R (2+); L (2+) \par Brachioradialis- R (2+); L (2+)   \par \par No ankle clonus

## 2023-07-17 NOTE — ASSESSMENT
[FreeTextEntry1] : A thorough discussion occurred regarding available pain management treatment options including interventional,\par rehabilitative, pharmacological, and alternative modalities with the patient. We will proceed with the following:\par \par Interventional treatment options:\par - Proceed with TPI today for cervical myofascial pain component; can repeat Q6 weeks or on PRN basis\par - Patient without diagnostic or therapeutic benefit following MICHAEL; would not recommend further at this point\par - see additional instructions below\par \par Rehabilitative options:\par - Continue physical therapy as per orthopedics\par - Encouraged active participation in HEP as tolerated\par - Cervical spine home exercise sheet provided\par \par Medication based treatment options:\par - Advised against use of oral NSAIDs secondary to chronic anti-coagulation \par - Continue Flexeril 5-10 mg up to TID as needed for spasm\par - may consider trial of anti- neuropathic medicine with failure of interventional therapy\par - May consider addition of topical OTC analgesic\par - see additional instructions below\par \par Complementary treatment options:\par - lifestyle modifications discussed\par - consider trial of acupuncture\par \par Additional treatment recommendations as follows:\par - f/u with Dr. Timmons as directed for ongoing LUE weakness\par - Follow-up for TPI as directed or 3 months\par - Possible EMG/NCV UE for ongoing upper extremity paresthesias; rule out peripheral nerve entrapment\par \par The risks, benefits and alternatives of the proposed procedure were explained in detail with the patient. The risks outlined include, but are not limited to, infection, bleeding, nerve injury, a temporary increase in pain, failure to resolve symptoms, allergic reaction, and possible elevation of blood sugar in diabetics. All questions were answered to patient's apparent satisfaction and he/she verbalized an understanding.\par \par Adrian ANDREWS, personally performed the services described under supervision of Ruslan Adame DO.\par \par The documentation recorded by the scribe, in my presence, accurately reflects the service I personally performed, and the decisions made by me with my edits as appropriate.
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
## 2023-07-17 NOTE — WORK
[Total (100%)] : total (100%) [Patient] : patient [No Rx restrictions] : No Rx restrictions. [I provided the services listed above] :  I provided the services listed above. [FreeTextEntry1] : poor [FreeTextEntry3] : Degree of impairment with regard to cervical spine only

## 2023-07-17 NOTE — PROCEDURE
[Trigger point 3 or more muscle groups] : Trigger point 3 or more muscle groups [Cervical paraspinal muscle] : cervical paraspinal muscle [Trapezius muscle] : trapezius muscle [Rhomboid muscle] : rhomboid muscle [Pain] : pain [Ethyl Chloride sprayed topically] : ethyl chloride sprayed topically [Sterile technique used] : sterile technique used [___ cc    0.25%] : Bupivacaine (Marcaine) ~Vcc of 0.25%  [] : Patient tolerated procedure well [Call if redness, pain or fever occur] : call if redness, pain or fever occur [Apply ice for 15min out of every hour for the next 12-24 hours as tolerated] : apply ice for 15 minutes out of every hour for the next 12-24 hours as tolerated [Risks, benefits, alternatives discussed / Verbal consent obtained] : the risks benefits, and alternatives have been discussed, and verbal consent was obtained [Bilateral] : bilaterally of the

## 2023-07-31 ENCOUNTER — APPOINTMENT (OUTPATIENT)
Dept: PAIN MANAGEMENT | Facility: CLINIC | Age: 63
End: 2023-07-31
Payer: MEDICARE

## 2023-07-31 DIAGNOSIS — M43.17 SPONDYLOLISTHESIS, LUMBOSACRAL REGION: ICD-10-CM

## 2023-07-31 DIAGNOSIS — R20.0 ANESTHESIA OF SKIN: ICD-10-CM

## 2023-07-31 DIAGNOSIS — Z87.39 PERSONAL HISTORY OF OTHER DISEASES OF THE MUSCULOSKELETAL SYSTEM AND CONNECTIVE TISSUE: ICD-10-CM

## 2023-07-31 DIAGNOSIS — R20.2 ANESTHESIA OF SKIN: ICD-10-CM

## 2023-07-31 DIAGNOSIS — M51.37 OTHER INTERVERTEBRAL DISC DEGENERATION, LUMBOSACRAL REGION: ICD-10-CM

## 2023-07-31 DIAGNOSIS — M47.817 SPONDYLOSIS W/OUT MYELOPATHY OR RADICULOPATHY, LUMBOSACRAL REGION: ICD-10-CM

## 2023-07-31 DIAGNOSIS — M79.2 NEURALGIA AND NEURITIS, UNSPECIFIED: ICD-10-CM

## 2023-07-31 PROCEDURE — 99204 OFFICE O/P NEW MOD 45 MIN: CPT

## 2023-07-31 NOTE — PROCEDURE
[Trigger point 3 or more muscle groups] : Trigger point 3 or more muscle groups [Bilateral] : bilaterally of the [Cervical paraspinal muscle] : cervical paraspinal muscle [Trapezius muscle] : trapezius muscle [Rhomboid muscle] : rhomboid muscle [Pain] : pain [Ethyl Chloride sprayed topically] : ethyl chloride sprayed topically [Sterile technique used] : sterile technique used [___ cc    0.25%] : Bupivacaine (Marcaine) ~Vcc of 0.25%  [] : Patient tolerated procedure well [Call if redness, pain or fever occur] : call if redness, pain or fever occur [Apply ice for 15min out of every hour for the next 12-24 hours as tolerated] : apply ice for 15 minutes out of every hour for the next 12-24 hours as tolerated [Risks, benefits, alternatives discussed / Verbal consent obtained] : the risks benefits, and alternatives have been discussed, and verbal consent was obtained

## 2023-08-02 VITALS — HEIGHT: 67 IN | WEIGHT: 142 LBS | BODY MASS INDEX: 22.29 KG/M2

## 2023-08-02 NOTE — DATA REVIEWED
[Cervical Spine] : cervical spine [I reviewed the films/CD] : I reviewed the films/CD [MRI] : MRI [Left] : left [Shoulder] : shoulder [Report was reviewed and noted in the chart] : The report was reviewed and noted in the chart [Lumbar Spine] : lumbar spine

## 2023-08-03 PROBLEM — M79.2 NEURALGIA AND NEURITIS: Status: RESOLVED | Noted: 2023-05-30 | Resolved: 2023-08-03

## 2023-08-03 PROBLEM — M43.17 SPONDYLOLISTHESIS AT L5-S1 LEVEL: Status: RESOLVED | Noted: 2023-05-18 | Resolved: 2023-08-03

## 2023-08-03 PROBLEM — R20.0 NUMBNESS AND TINGLING OF BOTH LOWER EXTREMITIES: Status: RESOLVED | Noted: 2023-05-30 | Resolved: 2023-08-03

## 2023-08-03 PROBLEM — M47.817 OSTEOARTHRITIS OF LUMBOSACRAL SPINE: Status: RESOLVED | Noted: 2023-05-18 | Resolved: 2023-08-03

## 2023-08-03 PROBLEM — M51.37 DEGENERATION OF INTERVERTEBRAL DISC OF LUMBOSACRAL REGION: Status: RESOLVED | Noted: 2023-05-18 | Resolved: 2023-08-03

## 2023-08-03 PROBLEM — Z87.39 HISTORY OF MUSCLE SPASM: Status: RESOLVED | Noted: 2018-03-14 | Resolved: 2023-08-03

## 2023-08-07 NOTE — REASON FOR VISIT
[Follow-Up Visit] : a follow-up pain management visit [Initial Visit] : an initial pain management visit [FreeTextEntry2] : low back pain

## 2023-08-07 NOTE — HISTORY OF PRESENT ILLNESS
[Lower back] : lower back [Sudden] : sudden [5] : 5 [4] : 4 [Burning] : burning [Sharp] : sharp [Shooting] : shooting [Constant] : constant [Household chores] : household chores [Leisure] : leisure [Rest] : rest [Standing] : standing [Disabled] : Work status: disabled [FreeTextEntry1] : 2023 - Patient presents for new patient evaluation regarding her lower back pain. Patient has a long history of chronic lower back pain, but she had suffered a fall in 2022 [place] [employer] [place] on vacation which markedly exacerbated her pain.  Patients' pain is focal across the lower back, with no radicular pain to the lower extremities, but she does notice subjective weakness in both legs causing occasional falls; rising from being seated after a long time will exacerbate her lower back pain.  Patient is dealing with some concurrent medical issues which she is trying to resolve.  Previous Injections: 1) Left Shoulder Glenohumeral joint - (22, 23) 2) Left PM C7-T1 MICHAEL - (10/14/22)  Pertinent Surgical History:  1) ACDF C4-C6 -  ([facility]) 2) Left shoulder arthroscopy with BETH KERN (3/6/2017)  Imagin) MRI Lumbar Spine (2023)- [facility]  L1-L2: No disc bulge, spinal canal or foraminal narrowing. L2-L3: There is a disc bulge eccentric to the left. There is mild right greater than left facet arthrosis. There is mild narrowing of the right lateral recess and bilateral neural foramina. L3-L4: Disc bulge with superimposed right lateral recess/foraminal zone annular fissure and disc protrusion. Moderate right and mild left facet arthrosis. There is severe right and moderate left lateral recess narrowing. There is impingement of the descending L4 nerve roots. There is mild right greater than left foraminal narrowing. L4-L5: There is a disc bulge eccentric to the left. There is severe facet arthrosis. There is moderate left and severe right foraminal narrowing. There is severe spinal canal and lateral recess narrowing. There is impingement of the exiting L4 and descending L5 nerve roots. L5-S1: There is uncovering of the disc with superimposed disc bulge eccentric to the left, left lateral recess/foraminal zone annular fissure and disc protrusion. There is moderate right and severe left facet arthrosis. There is severe spinal canal narrowing and bilateral, moderate right and severe left foraminal narrowing. There is impingement of the exiting L5 and descending S1 nerve roots, left greater than right  2) MRI cervical Spine (23) - [facility]  C2-3: Central disc herniation mildly narrowing the spinal canal. There is uncinate hypertrophy and left-sided facet arthrosis mildly narrowing the left neural foramen. The right neural foramen is patent. C3-4: Disc bulge with a posterior annular fissure mildly narrowing the spinal canal contributing to moderate left and mild right-sided neural from narrowing. C4-5: Bilateral uncinate hypertrophy moderately narrowing the neural foramen. There is no spinal canal stenosis at the operative level. C5-6: Bilateral uncinate hypertrophy resulting in severe right and moderate left-sided neural from narrowing. There is no spinal canal stenosis at the operative level. C6-7: Central protruding-type disc herniation mildly narrowing the spinal canal. There is no neural foraminal narrowing. C7-T1: No significant spinal canal or neural foraminal stenosis.  Electrodiagnostic studies: 1) EMG/NCV LE (23) - [facility] Impression: - Chronic L5-S1, L4 radiculopathy - No electrophysiologic evidence for large fiber polyneuropathy  Physician Disclaimer: I have personally reviewed and confirmed all HPI data with the patient. [] : yes

## 2023-08-07 NOTE — PHYSICAL EXAM
[Left] : left hand [] : positive Phalen's [de-identified] : Constitutional: - No acute distress - Well developed; well nourished  Neurological: - normal mood and affect - alert and oriented x 3  Cardiovascular: - grossly normal  Lumbar Spine Exam:  Inspection:  erythema (-)  ecchymosis (-)  rashes (-)  alignment: Lumbar Scoliosis  Palpation:  Midline lumbar tenderness:            (+)  midline thoracic tenderness:          (-)  Lumbar paraspinal tenderness:  L (+) ; R (+)  thoracic paraspinal tenderness: L (-) ; R (-)  sciatic nerve tenderness :          L (-) ; R (-)  SI joint tenderness:                     L (-) ; R (-)  GTB tenderness:                        L (-);  R (-)   ROM: reduced all planes Pain throughout range of motion testing  Strength:                                     Right       Left     Hip Flexion:                (5/5)       (5/5)  Quadriceps:               (5/5)       (5/5)  Hamstrings:                (5/5)       (5/5)  Ankle Dorsiflexion:     (5/5)       (5/5)  EHL:                           (5/5)       (5/5)  Ankle Plantarflexion:  (5/5)       (5/5)   Special Tests:  SLR:                            R (eq) ; L (eq)  Facet loading:             R (+) ; L (+)  GILSON test:                R (-) ; L (-)  Hamstring tightness:   R (+);  L (+)   Neurologic:  SILT throughout right lower extremity  SILT throughout left lower extremity   Reflexes: Areflexic bilateral lower extremities  Gait:  mildly antalgic gait  ambulates without assistive device

## 2023-08-07 NOTE — ASSESSMENT
[FreeTextEntry1] : A thorough discussion occurred regarding available pain management treatment options including interventional, rehabilitative, pharmacological, and alternative modalities with the patient. We will proceed with the following:  Interventional treatment options: - Proceed with caudal CARLOS with fluoroscopic guidance - Patient requires clearance to hold Plavix x7 days for indicated procedure - see additional instructions below  Rehabilitative options: - Continue physical therapy as per orthopedics - Encouraged active participation in HEP as tolerated - home exercise sheets provided at prior visits  Medication based treatment options: - Advised against use of oral NSAIDs secondary to chronic anti-coagulation  - Continue Flexeril 5-10 mg up to TID as needed for spasm - may consider trial of anti- neuropathic medicine with failure of interventional therapy - May consider addition of topical OTC analgesic - see additional instructions below  Complementary treatment options: - lifestyle modifications discussed - consider trial of acupuncture  Additional treatment recommendations as follows: - Patient will follow-up with Dr. Timmons as directed - Follow up 1-2 weeks post injection for assessment of efficacy and further treatment recommendations  The risks, benefits and alternatives of the proposed procedure were explained in detail with the patient. The risks outlined include, but are not limited to, infection, bleeding, nerve injury, a temporary increase in pain, failure to resolve symptoms, allergic reaction, and possible elevation of blood sugar in diabetics. All questions were answered to patient's apparent satisfaction and he/she verbalized an understanding.  I, Remi Jacobs acting as scribe, attest that this documentation has been prepared under the direction and in the presence of Provider Ruslan Adame DO.   The documentation recorded by the scribe, in my presence, accurately reflects the service I personally performed, and the decisions made by me with my edits as appropriate.

## 2023-08-18 ENCOUNTER — RX RENEWAL (OUTPATIENT)
Age: 63
End: 2023-08-18

## 2023-08-30 NOTE — DATA REVIEWED
[MRI] : MRI [Cervical Spine] : cervical spine [Lumbar Spine] : lumbar spine [Report was reviewed and noted in the chart] : The report was reviewed and noted in the chart [I reviewed the films/CD] : I reviewed the films/CD

## 2023-08-31 ENCOUNTER — APPOINTMENT (OUTPATIENT)
Dept: PAIN MANAGEMENT | Facility: CLINIC | Age: 63
End: 2023-08-31

## 2023-09-05 NOTE — HISTORY OF PRESENT ILLNESS
[FreeTextEntry1] : 2023- Patient presents for FUV regarding their lower back pain.   2023 - Patient presents for new patient evaluation regarding her lower back pain. Patient has a long history of chronic lower back pain, but she had suffered a fall in 2022 at the [employer] [place] on vacation which markedly exacerbated her pain.  Patients' pain is focal across the lower back, with no radicular pain to the lower extremities, but she does notice subjective weakness in both legs causing occasional falls; rising from being seated after a long time will exacerbate her lower back pain.  Patient is dealing with some concurrent medical issues which she is trying to resolve.  Previous Injections: 1) Left Shoulder Glenohumeral joint - (22, 23) 2) Left PM C7-T1 MICHAEL - (10/14/22)  Pertinent Surgical History:  1) ACDF C4-C6 -  (Iain) 2) Left shoulder arthroscopy with BETH KERN (3/6/2017)  Imagin) MRI Lumbar Spine (2023)- [facility]  L1-L2: No disc bulge, spinal canal or foraminal narrowing. L2-L3: There is a disc bulge eccentric to the left. There is mild right greater than left facet arthrosis. There is mild narrowing of the right lateral recess and bilateral neural foramina. L3-L4: Disc bulge with superimposed right lateral recess/foraminal zone annular fissure and disc protrusion. Moderate right and mild left facet arthrosis. There is severe right and moderate left lateral recess narrowing. There is impingement of the descending L4 nerve roots. There is mild right greater than left foraminal narrowing. L4-L5: There is a disc bulge eccentric to the left. There is severe facet arthrosis. There is moderate left and severe right foraminal narrowing. There is severe spinal canal and lateral recess narrowing. There is impingement of the exiting L4 and descending L5 nerve roots. L5-S1: There is uncovering of the disc with superimposed disc bulge eccentric to the left, left lateral recess/foraminal zone annular fissure and disc protrusion. There is moderate right and severe left facet arthrosis. There is severe spinal canal narrowing and bilateral, moderate right and severe left foraminal narrowing. There is impingement of the exiting L5 and descending S1 nerve roots, left greater than right  2) MRI cervical Spine (23) - [facility]  C2-3: Central disc herniation mildly narrowing the spinal canal. There is uncinate hypertrophy and left-sided facet arthrosis mildly narrowing the left neural foramen. The right neural foramen is patent. C3-4: Disc bulge with a posterior annular fissure mildly narrowing the spinal canal contributing to moderate left and mild right-sided neural from narrowing. C4-5: Bilateral uncinate hypertrophy moderately narrowing the neural foramen. There is no spinal canal stenosis at the operative level. C5-6: Bilateral uncinate hypertrophy resulting in severe right and moderate left-sided neural from narrowing. There is no spinal canal stenosis at the operative level. C6-7: Central protruding-type disc herniation mildly narrowing the spinal canal. There is no neural foraminal narrowing. C7-T1: No significant spinal canal or neural foraminal stenosis.  Electrodiagnostic studies: 1) EMG/NCV LE (23) - [facility] Impression: - Chronic L5-S1, L4 radiculopathy - No electrophysiologic evidence for large fiber polyneuropathy  Physician Disclaimer: I have personally reviewed and confirmed all HPI data with the patient.

## 2023-09-18 ENCOUNTER — APPOINTMENT (OUTPATIENT)
Dept: PAIN MANAGEMENT | Facility: CLINIC | Age: 63
End: 2023-09-18
Payer: OTHER MISCELLANEOUS

## 2023-09-18 VITALS — HEIGHT: 67 IN | WEIGHT: 145 LBS | BODY MASS INDEX: 22.76 KG/M2

## 2023-09-18 DIAGNOSIS — M79.18 MYALGIA, OTHER SITE: ICD-10-CM

## 2023-09-18 DIAGNOSIS — M54.12 RADICULOPATHY, CERVICAL REGION: ICD-10-CM

## 2023-09-18 DIAGNOSIS — Z98.1 ARTHRODESIS STATUS: ICD-10-CM

## 2023-09-18 PROCEDURE — 20553 NJX 1/MLT TRIGGER POINTS 3/>: CPT

## 2023-09-18 PROCEDURE — 99213 OFFICE O/P EST LOW 20 MIN: CPT | Mod: 25

## 2023-09-18 PROCEDURE — J3490M: CUSTOM

## 2023-10-06 ENCOUNTER — APPOINTMENT (OUTPATIENT)
Dept: PAIN MANAGEMENT | Facility: CLINIC | Age: 63
End: 2023-10-06

## 2023-10-24 ENCOUNTER — APPOINTMENT (OUTPATIENT)
Dept: ORTHOPEDIC SURGERY | Facility: AMBULATORY SURGERY CENTER | Age: 63
End: 2023-10-24
Payer: MEDICARE

## 2023-10-24 PROCEDURE — 62323 NJX INTERLAMINAR LMBR/SAC: CPT

## 2023-10-25 ENCOUNTER — NON-APPOINTMENT (OUTPATIENT)
Age: 63
End: 2023-10-25

## 2023-10-26 ENCOUNTER — APPOINTMENT (OUTPATIENT)
Dept: PAIN MANAGEMENT | Facility: CLINIC | Age: 63
End: 2023-10-26

## 2023-10-30 ENCOUNTER — APPOINTMENT (OUTPATIENT)
Dept: PAIN MANAGEMENT | Facility: CLINIC | Age: 63
End: 2023-10-30

## 2023-11-09 ENCOUNTER — APPOINTMENT (OUTPATIENT)
Dept: PAIN MANAGEMENT | Facility: CLINIC | Age: 63
End: 2023-11-09
Payer: MEDICARE

## 2023-11-09 VITALS — HEIGHT: 66 IN | WEIGHT: 145 LBS | BODY MASS INDEX: 23.3 KG/M2

## 2023-11-09 PROCEDURE — 99213 OFFICE O/P EST LOW 20 MIN: CPT

## 2023-11-15 ENCOUNTER — APPOINTMENT (OUTPATIENT)
Dept: ORTHOPEDIC SURGERY | Facility: CLINIC | Age: 63
End: 2023-11-15
Payer: MEDICARE

## 2023-11-15 VITALS — WEIGHT: 145 LBS | BODY MASS INDEX: 23.3 KG/M2 | HEIGHT: 66 IN

## 2023-11-15 DIAGNOSIS — M21.371 FOOT DROP, RIGHT FOOT: ICD-10-CM

## 2023-11-15 PROCEDURE — 99214 OFFICE O/P EST MOD 30 MIN: CPT

## 2024-01-03 RX ORDER — CYCLOBENZAPRINE HYDROCHLORIDE 10 MG/1
10 TABLET, FILM COATED ORAL
Qty: 90 | Refills: 2 | Status: ACTIVE | COMMUNITY
Start: 2022-05-26 | End: 1900-01-01

## 2024-01-03 RX ORDER — IBUPROFEN 800 MG/1
800 TABLET, FILM COATED ORAL 3 TIMES DAILY
Qty: 90 | Refills: 0 | Status: ACTIVE | COMMUNITY
Start: 2022-05-26 | End: 1900-01-01

## 2024-01-04 ENCOUNTER — APPOINTMENT (OUTPATIENT)
Dept: PAIN MANAGEMENT | Facility: CLINIC | Age: 64
End: 2024-01-04

## 2024-01-05 NOTE — PHYSICAL EXAM
[de-identified] : Constitutional: - No acute distress - Well developed; well nourished  Neurological: - normal mood and affect - alert and oriented x 3  Cardiovascular: - grossly normal  Lumbar Spine Exam:  Inspection:  erythema (-)  ecchymosis (-)  rashes (-)  alignment: Lumbar Scoliosis  Palpation:  Midline lumbar tenderness:            (+)  midline thoracic tenderness:          (-)  Lumbar paraspinal tenderness:  L (+) ; R (+)  thoracic paraspinal tenderness: L (-) ; R (-)  sciatic nerve tenderness :          L (-) ; R (-)  SI joint tenderness:                     L (-) ; R (-)  GTB tenderness:                        L (-);  R (-)   ROM: reduced all planes Pain throughout range of motion testing  Strength:                                     Right       Left     Hip Flexion:                (5/5)       (5/5)  Quadriceps:               (5/5)       (5/5)  Hamstrings:                (5/5)       (5/5)  Ankle Dorsiflexion:     (5/5)       (5/5)  EHL:                           (5/5)       (5/5)  Ankle Plantarflexion:  (5/5)       (5/5)   Special Tests:  SLR:                            R (eq) ; L (eq)  Facet loading:             R (-) ; L (-)  GILSON test:                R (-) ; L (-)  Hamstring tightness:   R (+);  L (+)   Neurologic:  SILT throughout right lower extremity  SILT throughout left lower extremity   Reflexes: Areflexic bilateral lower extremities  Gait:  mildly antalgic gait  ambulates without assistive device

## 2024-01-05 NOTE — HISTORY OF PRESENT ILLNESS
[FreeTextEntry1] : 2024- Patient presents for FUV regarding their lower back pain.   2023 - Patient is s/p caudal CARLOS 10/24 which provided her no significant relief or change in her pain.  The pain is not constant in her lower back, most of the time is worse in the morning.  She feels sometimes it is difficult to stand up straight.  She has occasional leg pain, but it does not last long.   2023 - Patient presents for new patient evaluation regarding her lower back pain. Patient has a long history of chronic lower back pain, but she had suffered a fall in 2022 at the [employer] on vacation which markedly exacerbated her pain.  Patients' pain is focal across the lower back, with no radicular pain to the lower extremities, but she does notice subjective weakness in both legs causing occasional falls; rising from being seated after a long time will exacerbate her lower back pain.  Patient is dealing with some concurrent medical issues which she is trying to resolve.  Previous Injections: 1) Left Shoulder Glenohumeral joint - (22, 23) 2) Left PM C7-T1 MICHAEL - (10/14/22) 3) Caudal CARLOS (10/24/2023)  Pertinent Surgical History:  1) ACDF C4-C6 -  (Iain) 2) Left shoulder arthroscopy with BETH KERN (3/6/2017)  Imagin) MRI Lumbar Spine (2023)- [facility]  L1-L2: No disc bulge, spinal canal or foraminal narrowing. L2-L3: There is a disc bulge eccentric to the left. There is mild right greater than left facet arthrosis. There is mild narrowing of the right lateral recess and bilateral neural foramina. L3-L4: Disc bulge with superimposed right lateral recess/foraminal zone annular fissure and disc protrusion. Moderate right and mild left facet arthrosis. There is severe right and moderate left lateral recess narrowing. There is impingement of the descending L4 nerve roots. There is mild right greater than left foraminal narrowing. L4-L5: There is a disc bulge eccentric to the left. There is severe facet arthrosis. There is moderate left and severe right foraminal narrowing. There is severe spinal canal and lateral recess narrowing. There is impingement of the exiting L4 and descending L5 nerve roots. L5-S1: There is uncovering of the disc with superimposed disc bulge eccentric to the left, left lateral recess/foraminal zone annular fissure and disc protrusion. There is moderate right and severe left facet arthrosis. There is severe spinal canal narrowing and bilateral, moderate right and severe left foraminal narrowing. There is impingement of the exiting L5 and descending S1 nerve roots, left greater than right  2) MRI Cervical Spine (23) - [facility]  C2-3: Central disc herniation mildly narrowing the spinal canal. There is uncinate hypertrophy and left-sided facet arthrosis mildly narrowing the left neural foramen. The right neural foramen is patent. C3-4: Disc bulge with a posterior annular fissure mildly narrowing the spinal canal contributing to moderate left and mild right-sided neural from narrowing. C4-5: Bilateral uncinate hypertrophy moderately narrowing the neural foramen. There is no spinal canal stenosis at the operative level. C5-6: Bilateral uncinate hypertrophy resulting in severe right and moderate left-sided neural from narrowing. There is no spinal canal stenosis at the operative level. C6-7: Central protruding-type disc herniation mildly narrowing the spinal canal. There is no neural foraminal narrowing. C7-T1: No significant spinal canal or neural foraminal stenosis.  Electrodiagnostic studies: 1) EMG/NCV LE (23) - [facility] Impression: - Chronic L5-S1, L4 radiculopathy - No electrophysiologic evidence for large fiber polyneuropathy  Physician Disclaimer: I have personally reviewed and confirmed all HPI data with the patient.

## 2024-01-05 NOTE — ASSESSMENT
[FreeTextEntry1] : A thorough discussion occurred regarding available pain management treatment options including interventional, rehabilitative, pharmacological, and alternative modalities with the patient. We will proceed with the following:  Interventional treatment options: - would hold off on repeat CARLOS; consider alternate level if second injection is warranted  - Patient requires clearance to hold Plavix x 7 days for indicated procedure - see additional instructions below  Rehabilitative options: - Continue physical therapy as per orthopedics - Encouraged active participation in HEP as tolerated - home exercise sheets provided at prior visits  Medication based treatment options: - Advised against use of oral NSAIDs secondary to chronic anti-coagulation - Continue Flexeril 5-10 mg up to TID as needed for spasm - may consider trial of anti- neuropathic medicine with failure of interventional therapy - May consider addition of topical OTC analgesic - see additional instructions below  Complementary treatment options: - lifestyle modifications discussed - consider trial of acupuncture  Additional treatment recommendations as follows: - Patient will follow-up with Dr. Timmons as directed; she wishes to discuss surgical options with him - Follow-up in 6 weeks or as needed basis  I, Remi Jacobs acting as scribe, attest that this documentation has been prepared under the direction and in the presence of Provider Ruslan Adame DO.   The documentation recorded by the scribe, in my presence, accurately reflects the service I personally performed, and the decisions made by me with my edits as appropriate.

## 2024-01-22 ENCOUNTER — APPOINTMENT (OUTPATIENT)
Dept: PAIN MANAGEMENT | Facility: CLINIC | Age: 64
End: 2024-01-22

## 2024-01-22 NOTE — PHYSICAL EXAM
[de-identified] : Constitutional: - No acute distress - Well developed; well nourished  Neurological: - normal mood and affect - alert and oriented x 3  Cardiovascular: - grossly normal  Lumbar Spine Exam:  Inspection:  erythema (-)  ecchymosis (-)  rashes (-)  alignment: Lumbar Scoliosis  Palpation:  Midline lumbar tenderness:            (+)  midline thoracic tenderness:          (-)  Lumbar paraspinal tenderness:  L (+) ; R (+)  thoracic paraspinal tenderness: L (-) ; R (-)  sciatic nerve tenderness :          L (-) ; R (-)  SI joint tenderness:                     L (-) ; R (-)  GTB tenderness:                        L (-);  R (-)   ROM: reduced all planes Pain throughout range of motion testing  Strength:                                     Right       Left     Hip Flexion:                (5/5)       (5/5)  Quadriceps:               (5/5)       (5/5)  Hamstrings:                (5/5)       (5/5)  Ankle Dorsiflexion:     (5/5)       (5/5)  EHL:                           (5/5)       (5/5)  Ankle Plantarflexion:  (5/5)       (5/5)   Special Tests:  SLR:                            R (eq) ; L (eq)  Facet loading:             R (-) ; L (-)  GILSON test:                R (-) ; L (-)  Hamstring tightness:   R (+);  L (+)   Neurologic:  SILT throughout right lower extremity  SILT throughout left lower extremity   Reflexes: Areflexic bilateral lower extremities  Gait:  mildly antalgic gait  ambulates without assistive device

## 2024-03-04 ENCOUNTER — APPOINTMENT (OUTPATIENT)
Dept: PAIN MANAGEMENT | Facility: CLINIC | Age: 64
End: 2024-03-04
Payer: MEDICARE

## 2024-03-04 VITALS — HEIGHT: 66 IN | BODY MASS INDEX: 23.63 KG/M2 | WEIGHT: 147 LBS

## 2024-03-04 DIAGNOSIS — M51.36 OTHER INTERVERTEBRAL DISC DEGENERATION, LUMBAR REGION: ICD-10-CM

## 2024-03-04 DIAGNOSIS — M48.061 SPINAL STENOSIS, LUMBAR REGION WITHOUT NEUROGENIC CLAUDICATION: ICD-10-CM

## 2024-03-04 DIAGNOSIS — M41.56 OTHER SECONDARY SCOLIOSIS, LUMBAR REGION: ICD-10-CM

## 2024-03-04 DIAGNOSIS — M54.16 RADICULOPATHY, LUMBAR REGION: ICD-10-CM

## 2024-03-04 PROCEDURE — 99214 OFFICE O/P EST MOD 30 MIN: CPT

## 2024-03-04 RX ORDER — PREGABALIN 25 MG/1
25 CAPSULE ORAL 3 TIMES DAILY
Qty: 90 | Refills: 2 | Status: ACTIVE | COMMUNITY
Start: 2024-03-04 | End: 1900-01-01

## 2024-03-04 NOTE — ASSESSMENT
[FreeTextEntry1] : A thorough discussion occurred regarding available pain management treatment options including interventional, rehabilitative, pharmacological, and alternative modalities with the patient. We will proceed with the following:  Interventional treatment options: - Proceed with right L4-L5, L5-S1 TFESI (80 mg Kenalog) with fluoroscopic guidance - Will perform an ASC - Patient requires cardiology clearance to hold Plavix x 7 days for indicated procedure - see additional instructions below  Rehabilitative options: - Completed recent physical therapy trial - Encouraged active participation in HEP as tolerated - home exercise sheets provided at prior visits  Medication based treatment options: - Initiate trial of pregabalin 25 mg TID; titration schedule provided - Advised against use of oral NSAIDs secondary to chronic anti-coagulation - Continue Flexeril 5-10 mg up to TID as needed for spasm - May consider addition of topical OTC analgesic - see additional instructions below  Complementary treatment options: - lifestyle modifications discussed - consider trial of acupuncture  Additional treatment recommendations as follows: - Patient will follow-up with Dr. Timmons as directed - Follow up 1-2 weeks post injection for assessment of efficacy and further treatment recommendations  The risks, benefits and alternatives of the proposed procedure were explained in detail with the patient. The risks outlined include but are not limited to infection, bleeding, post- dural puncture headache, nerve injury, a temporary increase in pain, failure to resolve symptoms, need for future interventions, allergic reaction, and possible elevation of blood sugar in diabetics if using corticosteroid.  All questions were answered to patient's apparent satisfaction, and he/she verbalized an understanding.  I, Remi Jacobs acting as scribe, attest that this documentation has been prepared under the direction and in the presence of Provider Ruslan Adame DO.   The documentation recorded by the scribe, in my presence, accurately reflects the service I personally performed, and the decisions made by me with my edits as appropriate.

## 2024-03-04 NOTE — HISTORY OF PRESENT ILLNESS
[Neck] : neck [Work related] : work related [7] : 7 [Sudden] : sudden [Dull/Aching] : dull/aching [6] : 6 [Constant] : constant [Household chores] : household chores [Social interactions] : social interactions [Leisure] : leisure [Nothing helps with pain getting better] : Nothing helps with pain getting better [Walking] : walking [Extending back] : extending back [Bending forward] : bending forward [FreeTextEntry1] : 3/4/2024 - Patient presents for FUV regarding their lower back pain. Patients pain is currently in the right side of the lower back with radiation to the buttocks and occasional radiation down the right lower extremity.  She reports that previous injection performed nearly 6 months ago in retrospect did provide significant relief of her radiating left leg pain.  Once again she reports that she has been indicated for lumbar spinal surgery however wishes to avoid this.  2023 - Patient is s/p caudal CARLOS 10/24 which provided her no significant relief or change in her pain.  The pain is not constant in her lower back, most of the time is worse in the morning.  She feels sometimes it is difficult to stand up straight.  She has occasional leg pain, but it does not last long.   2023 - Patient presents for new patient evaluation regarding her lower back pain. Patient has a long history of chronic lower back pain, but she had suffered a fall in 2022 at the [email or web address] on vacation which markedly exacerbated her pain.  Patients' pain is focal across the lower back, with no radicular pain to the lower extremities, but she does notice subjective weakness in both legs causing occasional falls; rising from being seated after a long time will exacerbate her lower back pain.  Patient is dealing with some concurrent medical issues which she is trying to resolve.  Previous Injections: 1) Left PM C7-T1 MICHAEL - (10/14/22) 2) Caudal CARLOS (10/24/2023)  Pertinent Surgical History:  1) ACDF C4-C6 -  (Iain) 2) Left shoulder arthroscopy with BETH KERN (3/6/2017)  Imagin) MRI Lumbar Spine (2023)- [facility]  L1-L2: No disc bulge, spinal canal or foraminal narrowing. L2-L3: There is a disc bulge eccentric to the left. There is mild right greater than left facet arthrosis. There is mild narrowing of the right lateral recess and bilateral neural foramina. L3-L4: Disc bulge with superimposed right lateral recess/foraminal zone annular fissure and disc protrusion. Moderate right and mild left facet arthrosis. There is severe right and moderate left lateral recess narrowing. There is impingement of the descending L4 nerve roots. There is mild right greater than left foraminal narrowing. L4-L5: There is a disc bulge eccentric to the left. There is severe facet arthrosis. There is moderate left and severe right foraminal narrowing. There is severe spinal canal and lateral recess narrowing. There is impingement of the exiting L4 and descending L5 nerve roots. L5-S1: There is uncovering of the disc with superimposed disc bulge eccentric to the left, left lateral recess/foraminal zone annular fissure and disc protrusion. There is moderate right and severe left facet arthrosis. There is severe spinal canal narrowing and bilateral, moderate right and severe left foraminal narrowing. There is impingement of the exiting L5 and descending S1 nerve roots, left greater than right  2) MRI cervical Spine (23) - [facility]  C2-3: Central disc herniation mildly narrowing the spinal canal. There is uncinate hypertrophy and left-sided facet arthrosis mildly narrowing the left neural foramen. The right neural foramen is patent. C3-4: Disc bulge with a posterior annular fissure mildly narrowing the spinal canal contributing to moderate left and mild right-sided neural from narrowing. C4-5: Bilateral uncinate hypertrophy moderately narrowing the neural foramen. There is no spinal canal stenosis at the operative level. C5-6: Bilateral uncinate hypertrophy resulting in severe right and moderate left-sided neural from narrowing. There is no spinal canal stenosis at the operative level. C6-7: Central protruding-type disc herniation mildly narrowing the spinal canal. There is no neural foraminal narrowing. C7-T1: No significant spinal canal or neural foraminal stenosis.  Electrodiagnostic studies: 1) EMG/NCV LE (23) - [facility] Impression: - Chronic L5-S1, L4 radiculopathy - No electrophysiologic evidence for large fiber polyneuropathy  Physician Disclaimer: I have personally reviewed and confirmed all HPI data with the patient. [] : This patient has had an injection before: no [FreeTextEntry3] : 09/01/2016 [FreeTextEntry7] : shoulders [FreeTextEntry8] : watching tv

## 2024-03-04 NOTE — PHYSICAL EXAM
[de-identified] : Constitutional: - No acute distress - Well developed; well nourished  Neurological: - normal mood and affect - alert and oriented x 3  Cardiovascular: - grossly normal  Lumbar Spine Exam:  Inspection:  erythema (-)  ecchymosis (-)  rashes (-)  alignment: Lumbar Scoliosis  Palpation:  Midline lumbar tenderness:            (-)  midline thoracic tenderness:          (-)  Lumbar paraspinal tenderness:  L (+) ; R (+)  thoracic paraspinal tenderness: L (-) ; R (-)  sciatic nerve tenderness :          L (-) ; R (-)  SI joint tenderness:                     L (-) ; R (-)  GTB tenderness:                        L (-);  R (-)   ROM: reduced all planes Pain throughout range of motion testing  Strength:                                     Right       Left     Hip Flexion:                (5/5)       (5/5)  Quadriceps:               (5/5)       (5/5)  Hamstrings:                (5/5)       (5/5)  Ankle Dorsiflexion:     (5/5)       (5/5)  EHL:                           (5/5)       (5/5)  Ankle Plantarflexion:  (5/5)       (5/5)   Special Tests:  SLR:                            R (+) ; L (eq)  Facet loading:             R (-) ; L (-)  GILSON test:                R (-) ; L (-)  Hamstring tightness:   R (+);  L (+)   Neurologic:  SILT throughout right lower extremity  SILT throughout left lower extremity   Reflexes: Areflexic bilateral lower extremities  Gait:  mildly antalgic gait  ambulates without assistive device

## 2024-03-26 ENCOUNTER — APPOINTMENT (OUTPATIENT)
Dept: ORTHOPEDIC SURGERY | Facility: AMBULATORY SURGERY CENTER | Age: 64
End: 2024-03-26
Payer: MEDICARE

## 2024-03-26 PROCEDURE — 64484 NJX AA&/STRD TFRM EPI L/S EA: CPT | Mod: 59,RT

## 2024-03-26 PROCEDURE — 64483 NJX AA&/STRD TFRM EPI L/S 1: CPT | Mod: RT

## 2024-04-11 ENCOUNTER — APPOINTMENT (OUTPATIENT)
Dept: PAIN MANAGEMENT | Facility: CLINIC | Age: 64
End: 2024-04-11

## 2024-04-15 NOTE — PHYSICAL EXAM
[de-identified] : Constitutional: - No acute distress - Well developed; well nourished  Neurological: - normal mood and affect - alert and oriented x 3  Cardiovascular: - grossly normal  Lumbar Spine Exam:  Inspection:  erythema (-)  ecchymosis (-)  rashes (-)  alignment: Lumbar Scoliosis  Palpation:  Midline lumbar tenderness:            (-)  midline thoracic tenderness:          (-)  Lumbar paraspinal tenderness:  L (+) ; R (+)  thoracic paraspinal tenderness: L (-) ; R (-)  sciatic nerve tenderness :          L (-) ; R (-)  SI joint tenderness:                     L (-) ; R (-)  GTB tenderness:                        L (-);  R (-)   ROM: reduced all planes Pain throughout range of motion testing  Strength:                                     Right       Left     Hip Flexion:                (5/5)       (5/5)  Quadriceps:               (5/5)       (5/5)  Hamstrings:                (5/5)       (5/5)  Ankle Dorsiflexion:     (5/5)       (5/5)  EHL:                           (5/5)       (5/5)  Ankle Plantarflexion:  (5/5)       (5/5)   Special Tests:  SLR:                            R (+) ; L (eq)  Facet loading:             R (-) ; L (-)  GILSON test:                R (-) ; L (-)  Hamstring tightness:   R (+);  L (+)   Neurologic:  SILT throughout right lower extremity  SILT throughout left lower extremity   Reflexes: Areflexic bilateral lower extremities  Gait:  mildly antalgic gait  ambulates without assistive device

## 2024-04-15 NOTE — HISTORY OF PRESENT ILLNESS
[FreeTextEntry1] : 2024 - Patient presents for FUV after a right L4-5, L5-S1 TFESI on 3/26/2024.  3/4/2024 - Patient presents for FUV regarding their lower back pain. Patients pain is currently in the right side of the lower back with radiation to the buttocks and occasional radiation down the right lower extremity.  She reports that previous injection performed nearly 6 months ago in retrospect did provide significant relief of her radiating left leg pain.  Once again she reports that she has been indicated for lumbar spinal surgery however wishes to avoid this.  2023 - Patient is s/p caudal CARLOS 10/24 which provided her no significant relief or change in her pain.  The pain is not constant in her lower back, most of the time is worse in the morning.  She feels sometimes it is difficult to stand up straight.  She has occasional leg pain, but it does not last long.   2023 - Patient presents for new patient evaluation regarding her lower back pain. Patient has a long history of chronic lower back pain, but she had suffered a fall in 2022 at the [employer] on vacation which markedly exacerbated her pain.  Patients' pain is focal across the lower back, with no radicular pain to the lower extremities, but she does notice subjective weakness in both legs causing occasional falls; rising from being seated after a long time will exacerbate her lower back pain.  Patient is dealing with some concurrent medical issues which she is trying to resolve.  Previous Injections: 1) Left PM C7-T1 MICHAEL - (10/14/22) 2) Caudal CARLOS (10/24/2023) 3) Right L4-5, L5-S1 TFESI (3/26/2024)  Pertinent Surgical History:  1) ACDF C4-C6 -  (Rana) 2) Left shoulder arthroscopy with CONCHA, RCR (3/6/2017)  Imagin) MRI Lumbar Spine (2023)- [facility]  L1-L2: No disc bulge, spinal canal or foraminal narrowing. L2-L3: There is a disc bulge eccentric to the left. There is mild right greater than left facet arthrosis. There is mild narrowing of the right lateral recess and bilateral neural foramina. L3-L4: Disc bulge with superimposed right lateral recess/foraminal zone annular fissure and disc protrusion. Moderate right and mild left facet arthrosis. There is severe right and moderate left lateral recess narrowing. There is impingement of the descending L4 nerve roots. There is mild right greater than left foraminal narrowing. L4-L5: There is a disc bulge eccentric to the left. There is severe facet arthrosis. There is moderate left and severe right foraminal narrowing. There is severe spinal canal and lateral recess narrowing. There is impingement of the exiting L4 and descending L5 nerve roots. L5-S1: There is uncovering of the disc with superimposed disc bulge eccentric to the left, left lateral recess/foraminal zone annular fissure and disc protrusion. There is moderate right and severe left facet arthrosis. There is severe spinal canal narrowing and bilateral, moderate right and severe left foraminal narrowing. There is impingement of the exiting L5 and descending S1 nerve roots, left greater than right  2) MRI cervical Spine (23) - [facility]  C2-3: Central disc herniation mildly narrowing the spinal canal. There is uncinate hypertrophy and left-sided facet arthrosis mildly narrowing the left neural foramen. The right neural foramen is patent. C3-4: Disc bulge with a posterior annular fissure mildly narrowing the spinal canal contributing to moderate left and mild right-sided neural from narrowing. C4-5: Bilateral uncinate hypertrophy moderately narrowing the neural foramen. There is no spinal canal stenosis at the operative level. C5-6: Bilateral uncinate hypertrophy resulting in severe right and moderate left-sided neural from narrowing. There is no spinal canal stenosis at the operative level. C6-7: Central protruding-type disc herniation mildly narrowing the spinal canal. There is no neural foraminal narrowing. C7-T1: No significant spinal canal or neural foraminal stenosis.  Electrodiagnostic studies: 1) EMG/NCV LE (23) - [facility] Impression: - Chronic L5-S1, L4 radiculopathy - No electrophysiologic evidence for large fiber polyneuropathy  Physician Disclaimer: I have personally reviewed and confirmed all HPI data with the patient.

## 2024-05-01 ENCOUNTER — APPOINTMENT (OUTPATIENT)
Dept: ORTHOPEDIC SURGERY | Facility: CLINIC | Age: 64
End: 2024-05-01

## 2025-05-19 NOTE — HISTORY OF PRESENT ILLNESS
[FreeTextEntry1] : 2024- Patient presents for FUV regarding their lower back pain.   2023 - Patient is s/p caudal CARLOS 10/24 which provided her no significant relief or change in her pain.  The pain is not constant in her lower back, most of the time is worse in the morning.  She feels sometimes it is difficult to stand up straight.  She has occasional leg pain, but it does not last long.   2023 - Patient presents for new patient evaluation regarding her lower back pain. Patient has a long history of chronic lower back pain, but she had suffered a fall in 2022 at the [employer] on vacation which markedly exacerbated her pain.  Patients' pain is focal across the lower back, with no radicular pain to the lower extremities, but she does notice subjective weakness in both legs causing occasional falls; rising from being seated after a long time will exacerbate her lower back pain.  Patient is dealing with some concurrent medical issues which she is trying to resolve.  Previous Injections: 1) Left Shoulder Glenohumeral joint - (22, 23) 2) Left PM C7-T1 MICHAEL - (10/14/22) 3) Caudal CARLOS (10/24/2023)  Pertinent Surgical History:  1) ACDF C4-C6 -  (Iain) 2) Left shoulder arthroscopy with BETH KERN (3/6/2017)  Imagin) MRI Lumbar Spine (2023)- [facility]  L1-L2: No disc bulge, spinal canal or foraminal narrowing. L2-L3: There is a disc bulge eccentric to the left. There is mild right greater than left facet arthrosis. There is mild narrowing of the right lateral recess and bilateral neural foramina. L3-L4: Disc bulge with superimposed right lateral recess/foraminal zone annular fissure and disc protrusion. Moderate right and mild left facet arthrosis. There is severe right and moderate left lateral recess narrowing. There is impingement of the descending L4 nerve roots. There is mild right greater than left foraminal narrowing. L4-L5: There is a disc bulge eccentric to the left. There is severe facet arthrosis. There is moderate left and severe right foraminal narrowing. There is severe spinal canal and lateral recess narrowing. There is impingement of the exiting L4 and descending L5 nerve roots. L5-S1: There is uncovering of the disc with superimposed disc bulge eccentric to the left, left lateral recess/foraminal zone annular fissure and disc protrusion. There is moderate right and severe left facet arthrosis. There is severe spinal canal narrowing and bilateral, moderate right and severe left foraminal narrowing. There is impingement of the exiting L5 and descending S1 nerve roots, left greater than right  2) MRI cervical Spine (23) - [facility]  C2-3: Central disc herniation mildly narrowing the spinal canal. There is uncinate hypertrophy and left-sided facet arthrosis mildly narrowing the left neural foramen. The right neural foramen is patent. C3-4: Disc bulge with a posterior annular fissure mildly narrowing the spinal canal contributing to moderate left and mild right-sided neural from narrowing. C4-5: Bilateral uncinate hypertrophy moderately narrowing the neural foramen. There is no spinal canal stenosis at the operative level. C5-6: Bilateral uncinate hypertrophy resulting in severe right and moderate left-sided neural from narrowing. There is no spinal canal stenosis at the operative level. C6-7: Central protruding-type disc herniation mildly narrowing the spinal canal. There is no neural foraminal narrowing. C7-T1: No significant spinal canal or neural foraminal stenosis.  Electrodiagnostic studies: 1) EMG/NCV LE (23) - [facility] Impression: - Chronic L5-S1, L4 radiculopathy - No electrophysiologic evidence for large fiber polyneuropathy  Physician Disclaimer: I have personally reviewed and confirmed all HPI data with the patient.
(2) Male